# Patient Record
Sex: FEMALE | Race: BLACK OR AFRICAN AMERICAN | URBAN - METROPOLITAN AREA
[De-identification: names, ages, dates, MRNs, and addresses within clinical notes are randomized per-mention and may not be internally consistent; named-entity substitution may affect disease eponyms.]

---

## 2024-06-20 ENCOUNTER — HOME HEALTH ADMISSION (OUTPATIENT)
Age: 84
End: 2024-06-20
Payer: MEDICARE

## 2024-06-21 ENCOUNTER — HOME CARE VISIT (OUTPATIENT)
Age: 84
End: 2024-06-21

## 2024-06-22 ENCOUNTER — HOME CARE VISIT (OUTPATIENT)
Age: 84
End: 2024-06-22
Payer: MEDICARE

## 2024-06-22 PROCEDURE — G0299 HHS/HOSPICE OF RN EA 15 MIN: HCPCS

## 2024-06-24 ENCOUNTER — HOME CARE VISIT (OUTPATIENT)
Age: 84
End: 2024-06-24
Payer: MEDICARE

## 2024-06-24 VITALS
OXYGEN SATURATION: 94 % | DIASTOLIC BLOOD PRESSURE: 70 MMHG | TEMPERATURE: 98 F | SYSTOLIC BLOOD PRESSURE: 110 MMHG | RESPIRATION RATE: 18 BRPM | HEART RATE: 76 BPM

## 2024-06-24 VITALS
TEMPERATURE: 97.3 F | DIASTOLIC BLOOD PRESSURE: 78 MMHG | RESPIRATION RATE: 16 BRPM | SYSTOLIC BLOOD PRESSURE: 126 MMHG | OXYGEN SATURATION: 95 % | HEART RATE: 75 BPM

## 2024-06-24 PROCEDURE — G0299 HHS/HOSPICE OF RN EA 15 MIN: HCPCS

## 2024-06-24 ASSESSMENT — ENCOUNTER SYMPTOMS
TROUBLE SWALLOWING: 1
DYSPNEA ACTIVITY LEVEL: AT REST
PAIN LOCATION - PAIN QUALITY: ACHY
PAIN LOCATION - PAIN QUALITY: INTERMITTENT

## 2024-06-24 NOTE — HOME HEALTH
this time.      Physician notified of patient admission to home health and plan of care including anticipated frequency of 3w4, 2 prn and treatments/interventions/modalities of disease process education, medication management and protime checks to be faxed to dr hernandez m/w/f.    Discharge planning discussed with patient and caregiver.  Discharge planning as follows: when patient is independent with disease process and medication managment.  Pt/Caregiver did verbalize understanding of discharge planning.     Next MD appointment July 1 with Dr. Hernandez and July 25 with Carl (neurology) MD/NP/PA.  Patient/caregiver encouraged/instructed to keep appointment as lack of follow through with physician appointment could result in discontinuation of home care services for non-compliance.

## 2024-06-24 NOTE — HOME HEALTH
Skilled reason for visit: Patient was recently hospitalized for Dysphagia, requiring observation by a SN for s/s of decomposition or adverse effects resulting from newly prescribed medications.  Skilled observation needed to determine if new medication regimen prescribed requires modifications or other therapeutic interventions considered until pt's clinical condition or treatment has stabilized.  Caregiver involvement:  Patient's caregiver is is her son. Caregiver assists patient with bathing, dressing, walking, bathroom, meal prep and setup, medication management, grocery shopping, household chores, transportation to MD appointment and home exercise program.  Medications reconciled and all medications are available in the home this visit.  The following education was provided regarding medications, medication interactions, and look alike modifications.    warfarin (COUMADIN) 5 MG tablet       Contact Agency or MD with questions.  Medications are effective at this time.  Patient states understanding.    Patient education provided this visit:  .X.... Disease Management: PT/INR teaching, Dysphagia teaching, pain management.  See interventions for further teaching  Patient level of understanding of education provided: Pt verbalized understanding of all education and repeated back teaching   Skilled Care Performed this visit: Disease process teaching, medication teaching, physical assessment and monitoring  Patient response to procedure performed: NA  Sharps Education Provided: GARRY  Goals/teaching progressing. Patient's goal is to regain her strength. Progressing toward goals. Patient has remained free from falls, free from infection; no safety concerns at this time.  SN to complete education of patient and patient to follow up with any further questions or concerns with Dr Obrien  Home exercise program:  Fall risk prevention  Continued need for the following skills: Nursing, PT   Patient and/or caregiver notified and

## 2024-06-25 ENCOUNTER — HOME CARE VISIT (OUTPATIENT)
Age: 84
End: 2024-06-25
Payer: MEDICARE

## 2024-06-25 VITALS
SYSTOLIC BLOOD PRESSURE: 130 MMHG | RESPIRATION RATE: 16 BRPM | OXYGEN SATURATION: 95 % | TEMPERATURE: 97.3 F | HEART RATE: 72 BPM | DIASTOLIC BLOOD PRESSURE: 78 MMHG

## 2024-06-25 LAB
INTERNATIONAL NORMALIZATION RATIO, POC: 16.5 (ref 0.9–1.1)
PROTHROMBIN TIME, POC: 1.4 (ref 11.8–14.9)

## 2024-06-25 PROCEDURE — G0155 HHCP-SVS OF CSW,EA 15 MIN: HCPCS

## 2024-06-25 PROCEDURE — G0299 HHS/HOSPICE OF RN EA 15 MIN: HCPCS

## 2024-06-25 ASSESSMENT — ENCOUNTER SYMPTOMS: PAIN LOCATION - PAIN QUALITY: INTERMITTENT

## 2024-06-25 NOTE — HOME HEALTH
Skilled reason for visit: Patient was recently hospitalized for Dysphagia, requiring observation by a SN for s/s of decomposition or adverse effects resulting from newly prescribed medications.  Skilled observation needed to determine if new medication regimen prescribed requires modifications or other therapeutic interventions considered until pt's clinical condition or treatment has stabilized.  Caregiver involvement:  Patient's caregiver is her son. Caregiver assists patient with bathing, dressing, walking, bathroom, meal prep and setup, medication management, grocery shopping, household chores, transportation to MD appointment and home exercise program.  Medications reconciled and all medications are available in the home this visit.  The following education was provided regarding medications, medication interactions, and look alike modifications.  traMADol (ULTRAM) 50 MG tablet         Contact Agency or MD with questions.  Medications are effective at this time.  Patient states understanding.    Patient education provided this visit:  .X.... Disease Management: PT/INR, Dysphagia teaching, pain management.  See interventions for further teaching  Patient level of understanding of education provided: Pt verbalized understanding of all education and repeated back teaching   Skilled Care Performed this visit: Disease process teaching, medication teaching, physical assessment and monitoring  Patient response to procedure performed:  NA  Sharps Education Provided: GARRY  Goals/teaching progressing. Patient's goal is to regain strength. Progressing toward goals. Patient has remained free from falls, free from infection; no safety concerns at this time and is ambulating independently.  SN to complete education of patient and patient to follow up with any further questions or concerns with Dr Hernandez  Home exercise program:  Fall risk prevention  Continued need for the following skills: Nursing, PT   Patient and/or

## 2024-06-26 ENCOUNTER — HOME CARE VISIT (OUTPATIENT)
Age: 84
End: 2024-06-26
Payer: MEDICARE

## 2024-06-26 VITALS
TEMPERATURE: 98.3 F | HEART RATE: 87 BPM | SYSTOLIC BLOOD PRESSURE: 111 MMHG | DIASTOLIC BLOOD PRESSURE: 63 MMHG | RESPIRATION RATE: 18 BRPM | OXYGEN SATURATION: 94 %

## 2024-06-26 PROCEDURE — G0151 HHCP-SERV OF PT,EA 15 MIN: HCPCS

## 2024-06-26 NOTE — HOME HEALTH
MSW met with the pt and her son/caregiver Frank Jean Baptiste. Pt relocated to [place] from [place], and her son stated she had a stroke the day after moving. MSW assisted pt and son in contacting [facility] about transportation benefits; pt has emergency ambulance coverage services or authorization through an ambulance company. Pt has post-discharge services through her former employer of 6 hours of care assistance through [employer] (in-home non-medical assistance), frozen meals (authorization completed), Personal Emergency Response System (medical alert) devices, and [employer] transportation ([phone]). Wheelchair transport was scheduled for 7/1/24 with Dr. TIMBO Hernandez (Res #[id number]) and 7/25/24 with Dr. GABY Henry (Res # [id number]). MSW provided resource information and invited a return call if additional resource questions arise. MSW will follow-up with pt and son about additional transit options, Advanced Directives and resources to support access and understanding.

## 2024-06-26 NOTE — CASE COMMUNICATION
SN missed visit, as patient was seen yesterday for PT/INR testing and will be seen again on Friday. Ronaldo Hernandez II, DO notified.

## 2024-06-27 ENCOUNTER — HOME CARE VISIT (OUTPATIENT)
Age: 84
End: 2024-06-27
Payer: MEDICARE

## 2024-06-27 VITALS
RESPIRATION RATE: 17 BRPM | DIASTOLIC BLOOD PRESSURE: 72 MMHG | OXYGEN SATURATION: 94 % | SYSTOLIC BLOOD PRESSURE: 118 MMHG | HEART RATE: 73 BPM | TEMPERATURE: 97.1 F

## 2024-06-27 PROCEDURE — G0153 HHCP-SVS OF S/L PATH,EA 15MN: HCPCS

## 2024-06-27 ASSESSMENT — ENCOUNTER SYMPTOMS
PROTECTIVE COUGH: 1
PRODUCTIVE COUGH: 1
DYSPNEA WITH EXERTION: 1
PAIN LOCATION - PAIN QUALITY: ACHE

## 2024-06-27 NOTE — HOME HEALTH
S: Patient was a poor historian but her son was present at the bedside and was able to report this last stroke was 6/3/24 and she did go to rehab before coming home. He reports she has been bed bound for greater than a year after she fell and fractured her L hip.   O: PAIN: see pain tab   WOUND:no open wounds noted or reported.   ROM: knee and ankle contractures consistent with bed bound status. LLE hemiparesis from old stroke for which patient wore an AFO when she was ambulatory. BUE AROM WFL   STRENGTH: unable to assess due to bed bound status and contractures but patient does have full AROM of R hip abd/add and is able to perform a SLR and has active DF/PF within limited contracted range of right ankle. Trace movement of LLE   BED MOBILITY: Mod-Min A for rolling, Max A/dependent for scooting   EQUIPMENT: hospital bed, WC, slide board  TRANSFERS: patient declined sitting EOB this visit and had just takend milk of magnesia and had a BM during my visit. Her son provided total toileting care.  GAIT: n/a  STEPS: n/a, first floor appartment   BALANCE: unable to assess sitting balance this visit   A:ASSESSMENT AND PROGRESS TOWARD GOALS:  Patient demonstrated a positive result to therapy this date as evidenced by patient/caregiver expressing an understanding of the rehab plan due to therapy verbal and written instructions. Goals established for increased independence in the home, safe mobility in the home, improvement in strength and balance all designed to reduce fall risk and progress toward independence.  Patient will benefit from continued PT intervention to progress toward meeting all established goals     P: 1W1, 2W3      PMH/Summary of clinical condition: Ritika Clancy is an 83 y.o. female referred with a dx of dysphagia s/p CVA 6/3/24. PMH:breast cancer, aortic valve replacement on Coumadin, hypertension, COPD, presenting with acute on chronic left-sided weakness with notable M2 proximal occlusion. Active

## 2024-06-27 NOTE — CASE COMMUNICATION
Ritika Clancy is an 83 y.o. female referred with a dx of dysphagia s/p CVA 6/3/24. PMH:breast cancer, aortic valve replacement on Coumadin, hypertension, COPD, presenting with acute on chronic left-sided weakness with notable M2 proximal occlusion. Active Problems:  Chronic obstructive pulmonary disease  History of aortic valve replacement  History of malignant neoplasm of breast  Hypertensive disorder  Left hemiparesis  Weakness of  bilateral lower limb  Pseudobulbar affect  Mild Vascular Neurocognitive Disorder  Contracture of joint of multiple sites  Facial weakness following cerebral infarction  Impaired cognition  Pharyngeal dysphagia  Plan: 1W1, 2W3

## 2024-06-28 ENCOUNTER — HOME CARE VISIT (OUTPATIENT)
Age: 84
End: 2024-06-28
Payer: MEDICARE

## 2024-06-28 VITALS
OXYGEN SATURATION: 95 % | RESPIRATION RATE: 16 BRPM | TEMPERATURE: 97.9 F | DIASTOLIC BLOOD PRESSURE: 77 MMHG | SYSTOLIC BLOOD PRESSURE: 108 MMHG | HEART RATE: 79 BPM

## 2024-06-28 PROCEDURE — G0299 HHS/HOSPICE OF RN EA 15 MIN: HCPCS

## 2024-06-28 ASSESSMENT — ENCOUNTER SYMPTOMS: PAIN LOCATION - PAIN QUALITY: INTERMITTENT

## 2024-06-28 NOTE — CASE COMMUNICATION
Patient presents with moderate cognitive deficits evidence of SLUMS Assessment score of 14/30, which suggests dementia. Patient demonstrated increased difficulty for tasks requiring orientation awareness, thought organization, problem solving, and stm recall. Patient demonstrated great participation in evaluation, but had difficulty with maintaining attention for tasks and recall of biographical information without assistance. Educated  patient on daily cognitive stimulation tasks to which patient verbalized understanding. Patient seen with regular solids and thin liquids with no overt signs or symptoms of aspiration/penetration. Educated patient on swallowing strategies (small bites/sips, sitting upright, slow pace, daily oral care) to which patient verbalized understanding. Patient requires skilled speech therapy services for 1wk3 to target cognitive lingusitic skil ls by implementing memory strategies, graded exercises, and caregiver education to improve participation in cognitive communication tasks for increased safety and independence.

## 2024-06-28 NOTE — HOME HEALTH
Skilled reason for visit: Patient was recently hospitalized for Dysphagia , requiring observation by a SN for s/s of decomposition or adverse effects resulting from newly prescribed medications.  Skilled observation needed to determine if new medication regimen prescribed requires modifications or other therapeutic interventions considered until pt's clinical condition or treatment has stabilized.  Caregiver involvement:  Patient's caregiver is her son. Caregiver assists patient with bathing, dressing, walking, bathroom, meal prep and setup, medication management, grocery shopping, household chores, transportation to MD appointment and home exercise program.  Medications reconciled and all medications are available in the home this visit.  The following education was provided regarding medications, medication interactions, and look alike modifications. atorvastatin (LIPITOR) 40 MG tablet          Contact Agency or MD with questions.  Medications are effective at this time.  Patient states understanding.    Patient education provided this visit:  .X.... Disease Management: Memeory issues teaching, pain management.  See interventions for further teaching  Patient level of understanding of education provided: Pt verbalized understanding of all education and repeated back teaching   Skilled Care Performed this visit: Disease process teaching, medication teaching, physical assessment and monitoring  Patient response to procedure performed:  GARRY  Sharps Education Provided: NA  Goals/teaching progressing. Patient's goal is to regain her strength. Progressing toward goals. Patient has remained free from falls, free from infection; no safety concerns at this time..  SN to complete education of patient and patient to follow up with any further questions or concerns with Dr Hernandez  Home exercise program:  Fall risk prevention  Continued need for the following skills: Nursing, PT, ST  Patient and/or caregiver notified and agree

## 2024-07-02 ENCOUNTER — HOME CARE VISIT (OUTPATIENT)
Age: 84
End: 2024-07-02
Payer: MEDICARE

## 2024-07-02 VITALS
SYSTOLIC BLOOD PRESSURE: 120 MMHG | OXYGEN SATURATION: 94 % | TEMPERATURE: 98.2 F | HEART RATE: 61 BPM | DIASTOLIC BLOOD PRESSURE: 72 MMHG | RESPIRATION RATE: 17 BRPM

## 2024-07-02 PROCEDURE — G0153 HHCP-SVS OF S/L PATH,EA 15MN: HCPCS

## 2024-07-03 ENCOUNTER — HOME CARE VISIT (OUTPATIENT)
Age: 84
End: 2024-07-03
Payer: MEDICARE

## 2024-07-03 PROCEDURE — G0152 HHCP-SERV OF OT,EA 15 MIN: HCPCS

## 2024-07-03 PROCEDURE — G0151 HHCP-SERV OF PT,EA 15 MIN: HCPCS

## 2024-07-03 NOTE — HOME HEALTH
SUBJECTIVE: Patient reported no changes since last session.     CAREGIVER INVOLVEMENT / ASSISTANCE NEEDED FOR: transportation, medication management, assists with ADLs    OBJECTIVE / PATIENT RESPONSE TO TREATMENT / PATIENT LEVEL OF UNDERSTANDING OF EDUCATION PROVIDED: Educated patient on compensatory memory strategies and practical applications, patient required mod-max cues in order to increase accuracy with stm recall. With implementation of graded exercises, patient was able to recall functional information with accuracy following delay. Patient's son had obtained visual calendar to assist patient with orientation awareness and recall of upcoming appointments. Patient continues to progress towards  speech therapy goals.    ASSESSMENT OF PROGRESS TOWARD GOALS: Patient demonstrated good response to recall of compensatory memory strategies.    CONTINUED NEED FOR THE FOLLOWING SKILLS: Patient continues to require skilled speech therapy services in order to improve cognitive lingusitic deficits for increased safety awareness and participation in daily cognitive communication tasks.    PLAN FOR NEXT VISIT : Plans to continue implementing compensatory memory strategies/graded exercises.    HOME EXERCISE PROGRAM: implement compensatory memory strategies.    THE FOLLOWING DISCHARGE PLANNING WAS DISCUSSED WITH THE PATIENT/CAREGIVER: ST to d/c in 2 more visits/wks or when goals met/max potential achieved, Pt/caregiver verbalized understanding.

## 2024-07-04 ENCOUNTER — HOME CARE VISIT (OUTPATIENT)
Age: 84
End: 2024-07-04
Payer: MEDICARE

## 2024-07-04 VITALS
DIASTOLIC BLOOD PRESSURE: 78 MMHG | OXYGEN SATURATION: 93 % | RESPIRATION RATE: 18 BRPM | SYSTOLIC BLOOD PRESSURE: 136 MMHG | HEART RATE: 76 BPM | TEMPERATURE: 98 F

## 2024-07-04 NOTE — HOME HEALTH
SUBJECTIVE: Patient stated she was doing well and her son stated he was able to get her to a doctor appointment on Monday using the slide board to get her into the WC.  CAREGIVER INVOLVEMENT/ASSISTANCE NEEDED FOR: son is very attentive and assiting with all care.  OBJECTIVE:  See interventions.  PATIENT EDUCATION PROVIDED THIS VISIT: patient/cg instructed in progression of a daily HEP and given written updated copy  PATIENT RESPONSE TO EDUCATION PROVIDED: patient expressed understanding with returned demonstration.   PATIENT RESPONSE TO TREATMENT: patient reported low back tightness and had some fear with sitting EOB this visit.  ASSESSMENT OF PROGRESS TOWARD GOALS: patient is making good progress towards goals as noted with her eagerness to participate in therapy session and she has great support from her son to assist in all areas of need.  PLAN FOR NEXT VISIT: observe transfers of CG with use of slide board to  or recliner  THE FOLLOWING DISCHARGE PLANNING WAS DISCUSSED WITH THE PATIENT/CAREGIVER: 1W1, 2W3

## 2024-07-05 ENCOUNTER — HOME CARE VISIT (OUTPATIENT)
Age: 84
End: 2024-07-05
Payer: MEDICARE

## 2024-07-05 VITALS
OXYGEN SATURATION: 93 % | TEMPERATURE: 97.9 F | DIASTOLIC BLOOD PRESSURE: 80 MMHG | RESPIRATION RATE: 16 BRPM | SYSTOLIC BLOOD PRESSURE: 140 MMHG | HEART RATE: 70 BPM

## 2024-07-05 PROCEDURE — G0299 HHS/HOSPICE OF RN EA 15 MIN: HCPCS

## 2024-07-05 NOTE — HOME HEALTH
dynamic sitting balance/tolerance. Pt is bed bound. Therapist unable to assess static and dynamic standing balance/tolerance.    AMBULATION: Pt unable to ambulate. Pt is bedbound.  BED MOBILITY: Pt MODERATE ASSISTANCE with bed mobility  SIT TO STAND: Pt unable to perform sit<->stand. Pt is bedbound.  TOILET: N/A  SHOWER: N/A    Barthel Index: 20  IADLs: Son performs all IADLs   DME ORDERED/RECOMMENDED: Leg     PATIENT RESPONSE TO TREATMENT: Pt tolerated treatment well with no reports of increased pain/discomfort throughout the session.     PATIENT EDUCATION PROVIDED THIS VISIT: Pt and/or caregiver trained/educated in the role of occupational therapy; areas of occupation; occupation-based activity demands; performance patterns and skills; context and environmental factors; safety; balance; fall prevention; activity modifications; compensatory strategies; use of adaptive device(s) and/or DME; proper posture and body mechanics; progression of treatment plan; visit frequency and duration; discharge planning; and contact procedures for routine questions and emergency situations. This therapist reviewed the patient's needs to ensure no additional resources are needed at this time and confirm the current resources are sufficient and appropriate. All training and education is for the purpose of increasing safety and awareness of function in order to increase the patient's ability to perform ADLs and access the community with a decreasing degree of difficulty. This will improve the patient's quality of life by increasing his/her physical, psychological and social functioning while minimizing the necessity of future skilled medical services.     PATIENT LEVEL OF UNDERSTANDING OF EDUCATION PROVIDED: Pt and son demonstrated good understanding of role of OT, plan of care, visit frequency and discharge planning. Both will require reinforcement of education for carryover and adherence.     REHAB POTENTIAL: Ms. Clancy

## 2024-07-05 NOTE — HOME HEALTH
Skilled reason for visit: Patient was recently hospitalized for Dysphagia, requiring observation by a SN for s/s of decomposition or adverse effects resulting from newly prescribed medications.  Skilled observation needed to determine if new medication regimen prescribed requires modifications or other therapeutic interventions considered until pt's clinical condition or treatment has stabilized.  Caregiver involvement:  Patient's caregiver is her son. Caregiver assists patient with bathing, dressing, walking, bathroom, meal prep and setup, medication management, grocery shopping, household chores, transportation to MD appointment and home exercise program.  Medications reconciled and all medications are available in the home this visit.  The following education was provided regarding medications, medication interactions, and look alike modifications.  atorvastatin (LIPITOR) 40 MG tablet         Contact Agency or MD with questions.  Medications are effective at this time.  Patient states understanding.    Patient education provided this visit:  .X.... Disease Management: PT/INR teaching, HTN teaching.  See interventions for further teaching  Skilled Care Performed this visit: Disease process teaching, medication teaching, physical assessment and monitoring  Patient response to procedure performed: NA  Sharps Education Provided: NA  Goals/teaching progressing. Patient's goal is to regain strength. Progressing toward goals. Patient has remained free from falls, free from infection; no safety concerns at this time.  SN to complete education of patient and patient to follow up with any further questions or concerns with NP Emmanuel  Home exercise program:  Fall risk prevention  Continued need for the following skills: Nursing, PT   Patient and/or caregiver notified and agree to changes in the Plan of Care: No changes  The following discharge planning was discussed with the pt/caregiver:  SN to continue education of

## 2024-07-06 ENCOUNTER — HOME CARE VISIT (OUTPATIENT)
Age: 84
End: 2024-07-06
Payer: MEDICARE

## 2024-07-06 VITALS
OXYGEN SATURATION: 97 % | DIASTOLIC BLOOD PRESSURE: 74 MMHG | HEART RATE: 70 BPM | RESPIRATION RATE: 16 BRPM | TEMPERATURE: 97.3 F | SYSTOLIC BLOOD PRESSURE: 132 MMHG

## 2024-07-06 PROCEDURE — G0157 HHC PT ASSISTANT EA 15: HCPCS

## 2024-07-08 ENCOUNTER — HOME CARE VISIT (OUTPATIENT)
Age: 84
End: 2024-07-08
Payer: MEDICARE

## 2024-07-08 VITALS
OXYGEN SATURATION: 98 % | SYSTOLIC BLOOD PRESSURE: 123 MMHG | HEART RATE: 68 BPM | RESPIRATION RATE: 14 BRPM | TEMPERATURE: 98.3 F | DIASTOLIC BLOOD PRESSURE: 76 MMHG

## 2024-07-08 VITALS
SYSTOLIC BLOOD PRESSURE: 138 MMHG | DIASTOLIC BLOOD PRESSURE: 80 MMHG | RESPIRATION RATE: 18 BRPM | HEART RATE: 70 BPM | TEMPERATURE: 97.9 F | OXYGEN SATURATION: 95 %

## 2024-07-08 PROCEDURE — G0152 HHCP-SERV OF OT,EA 15 MIN: HCPCS

## 2024-07-08 NOTE — HOME HEALTH
Subjective: Patient reports that she has not fallen since last visit and is changing position in bed occasionally, but no regularly. Patient found long sitting in bed with heels floating via pillows under calves bilaterally.     Objective: Skilled home health physical therapy interventions completed today listed in care plan section.  Interventions performed today to assist in return to prior level of function, address deficits as apparent upon time of initial evaluation, return to community and personal activities, and progress further towards goals as previously established in plan of care. There are not any changes to medications upon timing of this visit. Home health supplies were not ordered/delivered today. Trained in need for more regular repositioning in bed to reduce pressure injury risk with both patient and caregiver verbalizing understanding. Patient able to roll to side modified independent today using bed rail but needed additional assistance for scooting repositioning including reminders for hospital bed positioning. Noted triceps weakness and poor trunk control with sliding board transfer today.     Assessment:  Patient is slowly progressing towards goals as previously established in POC with skilled home health physical therapy services at this time as made apparent by improving understanding for regular HEP completion and repositioning so that she can make additional progress along with her verbal agreement to trying to do so. Would benefit from working at sitting at edge of bed to improve trunk control. Family/caregiver son involvement is present/set-up at this point in time and assists with all aspects of patient's care and needs. Trained patient caregiver son in safety techniques for positioning of AD w/c and sliding board to improve transfer ability, but will need review.     Plan:  Discharge planning discussed at this visit regarding eventual discharge once patient has met goals and/or met

## 2024-07-09 ENCOUNTER — HOME CARE VISIT (OUTPATIENT)
Age: 84
End: 2024-07-09
Payer: MEDICARE

## 2024-07-09 VITALS
OXYGEN SATURATION: 92 % | HEART RATE: 78 BPM | RESPIRATION RATE: 18 BRPM | TEMPERATURE: 97.9 F | DIASTOLIC BLOOD PRESSURE: 78 MMHG | SYSTOLIC BLOOD PRESSURE: 122 MMHG

## 2024-07-09 VITALS
OXYGEN SATURATION: 93 % | TEMPERATURE: 98.2 F | RESPIRATION RATE: 17 BRPM | SYSTOLIC BLOOD PRESSURE: 100 MMHG | HEART RATE: 80 BPM | DIASTOLIC BLOOD PRESSURE: 65 MMHG

## 2024-07-09 VITALS
TEMPERATURE: 97.3 F | RESPIRATION RATE: 16 BRPM | HEART RATE: 79 BPM | SYSTOLIC BLOOD PRESSURE: 107 MMHG | OXYGEN SATURATION: 95 % | DIASTOLIC BLOOD PRESSURE: 73 MMHG

## 2024-07-09 LAB
INTERNATIONAL NORMALIZATION RATIO, POC: 14.8 (ref 0.9–1.1)
PROTHROMBIN TIME, POC: 1.2 (ref 11.8–14.9)

## 2024-07-09 PROCEDURE — G0299 HHS/HOSPICE OF RN EA 15 MIN: HCPCS

## 2024-07-09 PROCEDURE — G0153 HHCP-SVS OF S/L PATH,EA 15MN: HCPCS

## 2024-07-09 PROCEDURE — G0152 HHCP-SERV OF OT,EA 15 MIN: HCPCS

## 2024-07-09 NOTE — HOME HEALTH
SUBJECTIVE: Pt and son unprepared for therapist but willing to participate in OT session. Son is present to observe and learn.    CAREGIVER INVOLVEMENT/ASSISTANCE NEEDED FOR: Son assists with all I/ADLS     HOME HEALTH SUPPLIES BY TYPE AND QUANTITY ORDERED/DELIVERED THIS VISIT INCLUDE: NONE      OBJECTIVE: See interventions.      PATIENT EDUCATION PROVIDED THIS VISIT: Pt trained/educated in occupation-based activity demands; performance patterns and skills; context and environmental factors; safety; balance; fall prevention; activity modifications; compensatory strategies; use of adaptive device(s) and/or DME; proper posture and body mechanics; progression of treatment plan; visit frequency and duration; discharge planning; and contact procedures for routine questions and emergency situations. This therapist reviewed the patient's needs to ensure no additional resources are needed at this time and confirm the current resources are sufficient and appropriate. All training and education are for the purpose of increasing safety and awareness of function in order to increase the patient's ability to perform ADLs, IADLs and access the community with a decreasing degree of difficulty. This will improve the patient's quality of life by increasing his/her physical, psychological and social functioning while minimizing the necessity of future skilled medical services.    PATIENT RESPONSE TO EDUCATION: Patient and son able to teach-back education with min verbal cues. Handouts provided for reinforcement.       RESPONSE TO TREATMENT: Pt tolerated treatment well as evidenced by vitals remained stable throughout session. No reports of increased pain/discomfort throughout session. No reports of dizziness, lightheadedness, weakness, etc. Pt left resting comfortably in bed with pressure areas offloaded, son in attendance, and with all needs met.       ASSESSMENT OF PROGRESS TOWARD GOALS: Pt is progressing well towards OT goals and

## 2024-07-09 NOTE — HOME HEALTH
Skilled reason for visit: Patient was recently hospitalized for Dysphagia , requiring observation by a SN for s/s of decomposition or adverse effects resulting from newly prescribed medications.  Skilled observation needed to determine if new medication regimen prescribed requires modifications or other therapeutic interventions considered until pt's clinical condition or treatment has stabilized.  Caregiver involvement:  Patient's caregiver is her son. Caregiver assists patient with bathing, dressing, walking, bathroom, meal prep and setup, medication management, grocery shopping, household chores, transportation to MD appointment and home exercise program.  Medications reconciled and all medications are available in the home this visit.  The following education was provided regarding medications, medication interactions, and look alike modifications.  carvedilol (COREG) 3.125 MG tablet         Contact Agency or MD with questions.  Medications are effective at this time.  Patient states understanding.    Patient education provided this visit:  .X.... Disease Management: PT/INR teaching.  See interventions for further teaching  Patient level of understanding of education provided: Pt verbalized understanding of all education and repeated back teaching   Skilled Care Performed this visit: Disease process teaching, medication teaching, physical assessment and monitoring  Patient response to procedure performed:  Pt verbalized satisfaction with PT/INR testing  Sharps Education Provided: NA  Goals/teaching progressing. Patient's goal is to regain her strength. Progressing toward goals. Patient has remained free from falls, free from infection; no safety concerns at this time.  SN to complete education of patient and patient to follow up with any further questions or concerns with Dr Hernandez  Home exercise program:  Fall risk prevention  Continued need for the following skills: Nursing, PT, OT, ST  Patient and/or

## 2024-07-10 ENCOUNTER — HOME CARE VISIT (OUTPATIENT)
Age: 84
End: 2024-07-10
Payer: MEDICARE

## 2024-07-10 VITALS
OXYGEN SATURATION: 94 % | HEART RATE: 85 BPM | DIASTOLIC BLOOD PRESSURE: 84 MMHG | RESPIRATION RATE: 16 BRPM | TEMPERATURE: 98.4 F | SYSTOLIC BLOOD PRESSURE: 136 MMHG

## 2024-07-10 PROCEDURE — G0151 HHCP-SERV OF PT,EA 15 MIN: HCPCS

## 2024-07-10 NOTE — HOME HEALTH
SUBJECTIVE: Patient reported no changes since last session.     CAREGIVER INVOLVEMENT / ASSISTANCE NEEDED FOR: transportation, medication management, assists with ADLs     OBJECTIVE / PATIENT RESPONSE TO TREATMENT / PATIENT LEVEL OF UNDERSTANDING OF EDUCATION PROVIDED: Patient demonstrated great participation in stm recall of address and memory strategies by implementing spaced retrieval training trials. With implementation of visual aids, patient was able to improve orientation awareness. With graded exercises, patient was able to complete mental manipulation tasks with increased accuracy. Patient continues to progress towards  speech therapy goals.     ASSESSMENT OF PROGRESS TOWARD GOALS: Patient demonstrated good improvement in stm recall with spaced retrieval training.    CONTINUED NEED FOR THE FOLLOWING SKILLS: Patient continues to require skilled speech therapy services in order to improve cognitive lingusitic deficits for increased safety awareness and participation in daily cognitive communication tasks.     PLAN FOR NEXT VISIT : Plans to discharge.    HOME EXERCISE PROGRAM: implement compensatory memory strategies.     THE FOLLOWING DISCHARGE PLANNING WAS DISCUSSED WITH THE PATIENT/CAREGIVER: ST to d/c in 1 more visits/wks or when goals met/max potential achieved, Pt/caregiver verbalized understanding.

## 2024-07-11 ENCOUNTER — HOME CARE VISIT (OUTPATIENT)
Age: 84
End: 2024-07-11
Payer: MEDICARE

## 2024-07-11 PROCEDURE — G0155 HHCP-SVS OF CSW,EA 15 MIN: HCPCS

## 2024-07-11 NOTE — HOME HEALTH
SUBJECTIVE: Patient stated she was tired today and just felt very uncomfortable because she had been struggling to have a BM all day. She was successful but feeling a little down today.  CAREGIVER INVOLVEMENT/ASSISTANCE NEEDED FOR: son attentive and assisting with all ADL's.  OBJECTIVE:  See interventions.  PATIENT EDUCATION PROVIDED THIS VISIT: patient instructed to continue with daily HEP and to try lowering head of bed more for supine to sit pull ups as well as doing them in a more upright position.  PATIENT RESPONSE TO EDUCATION PROVIDED: patient expressed understanding with returned demonstration  PATIENT RESPONSE TO TREATMENT: patient a little down and cried during visit, needing encouragement that it is okay to have bad days. She did not want to work on slide board transfers today due to not feeling well.  ASSESSMENT OF PROGRESS TOWARD GOALS: patient did not show any improvements this visit with functional mobility but she was fatigued from having a BM earlier and had a busy day yesterday with several appointments.   PLAN FOR NEXT VISIT: progress transfer training  THE FOLLOWING DISCHARGE PLANNING WAS DISCUSSED WITH THE PATIENT/CAREGIVER: 1W1, 2W1

## 2024-07-11 NOTE — HOME HEALTH
MSW met with the pt and her son, and discussed pt's current functioning and communication styles. Pt received meals through her insurance. Pt voiced completing her exercises and desires to be more independent. All parties discussed mental health, self-care, and strategies to support wellbeing. MSW discussed accessible transportation, POA and financial management. Pt has a neurology appointment 7/25/24. Pt has a visiting appointment 7/29/24 (provider/agency unclear). MSW will apply for [employer] for the pt to support accessible transportation.

## 2024-07-12 ENCOUNTER — HOME CARE VISIT (OUTPATIENT)
Age: 84
End: 2024-07-12
Payer: MEDICARE

## 2024-07-12 VITALS
DIASTOLIC BLOOD PRESSURE: 79 MMHG | RESPIRATION RATE: 16 BRPM | SYSTOLIC BLOOD PRESSURE: 129 MMHG | OXYGEN SATURATION: 95 % | TEMPERATURE: 98 F | HEART RATE: 79 BPM

## 2024-07-12 LAB
INTERNATIONAL NORMALIZATION RATIO, POC: 25.4 (ref 0.9–1.1)
PROTHROMBIN TIME, POC: 2.1 (ref 11.8–14.9)

## 2024-07-12 PROCEDURE — G0299 HHS/HOSPICE OF RN EA 15 MIN: HCPCS

## 2024-07-12 NOTE — HOME HEALTH
Skilled reason for visit: Patient was recently hospitalized for Dysphagia, requiring observation by a SN for s/s of decomposition or adverse effects resulting from newly prescribed medications.  Skilled observation needed to determine if new medication regimen prescribed requires modifications or other therapeutic interventions considered until pt's clinical condition or treatment has stabilized.  Caregiver involvement:  Patient's caregiver is her son. Caregiver assists patient with bathing, dressing, walking, bathroom, meal prep and setup, medication management, grocery shopping, household chores, transportation to MD appointment and home exercise program.  Medications reconciled and all medications are available in the home this visit.  The following education was provided regarding medications, medication interactions, and look alike modifications.   traMADol (ULTRAM) 50 MG tablet        Contact Agency or MD with questions.  Medications are effective at this time.  Patient states understanding.    Patient education provided this visit:  .X.... Disease Management: PT/INR teaching.  See interventions for further teaching  Patient level of understanding of education provided: Pt verbalized understanding of all education and repeated back teaching   Skilled Care Performed this visit: Disease process teaching, medication teaching, physical assessment and monitoring  Patient response to procedure performed:  Pt verbalized satisfaction with PT/INR testing  Sharps Education Provided: NA  Goals/teaching progressing. Patient's goal is to regain her strength. Progressing toward goals. Patient has remained free from falls, free from infection; no safety concerns at this time.  SN to complete education of patient and patient to follow up with any further questions or concerns with Dr Hernandez  Home exercise program:  Fall risk prevention  Continued need for the following skills: Nursing, PT   Patient and/or caregiver notified

## 2024-07-13 ENCOUNTER — HOME CARE VISIT (OUTPATIENT)
Age: 84
End: 2024-07-13
Payer: MEDICARE

## 2024-07-16 ENCOUNTER — HOME CARE VISIT (OUTPATIENT)
Age: 84
End: 2024-07-16
Payer: MEDICARE

## 2024-07-16 VITALS
DIASTOLIC BLOOD PRESSURE: 72 MMHG | OXYGEN SATURATION: 95 % | RESPIRATION RATE: 17 BRPM | HEART RATE: 81 BPM | TEMPERATURE: 98.3 F | SYSTOLIC BLOOD PRESSURE: 100 MMHG

## 2024-07-16 PROCEDURE — G0153 HHCP-SVS OF S/L PATH,EA 15MN: HCPCS

## 2024-07-16 NOTE — HOME HEALTH
SUBJECTIVE: Patient reported no changes since last session.     CAREGIVER INVOLVEMENT / ASSISTANCE NEEDED FOR: transportation, medication management, assists with ADLs     OBJECTIVE / PATIENT RESPONSE TO TREATMENT / PATIENT LEVEL OF UNDERSTANDING OF EDUCATION PROVIDED/ASSESSMENT OF PROGRESS TOWARD GOALS: Patient seen for speech therapy discipline discharge today. Patient completed SLUMS Assessment with score of 21/30, which is improvement from initial evaluation score of 14/30. With implementation of graded exercises, spaced retrieval training, and visual aids, patient was able to demonstrate improvement in orientation awareness, thought organization, and stm recall. Caregiver also demonstrated great carryover of recommendations/communication strategies in order to support cognitive lingusitic skills. Patient was able to teach-back strategies and home maintenance program. Patient continued to deny dysphagia symptoms. Patient met all  speech therapy goals and is appropriate for discharge.

## 2024-07-16 NOTE — CASE COMMUNICATION
Patient seen for speech therapy discipline discharge today. Patient completed SLUMS Assessment with score of 21/30, which is improvement from initial evaluation score of 14/30. With implementation of graded exercises, spaced retrieval training, and visual aids, patient was able to demonstrate improvement in orientation awareness, thought organization, and stm recall. Caregiver also demonstrated great carryover of recommendations/communi cation strategies in order to support cognitive lingusitic skills. Patient was able to teach-back strategies and home maintenance program. Patient continued to deny dysphagia symptoms. Patient met all  speech therapy goals and is appropriate for discharge.

## 2024-07-17 ENCOUNTER — HOME CARE VISIT (OUTPATIENT)
Age: 84
End: 2024-07-17
Payer: MEDICARE

## 2024-07-18 ENCOUNTER — HOME CARE VISIT (OUTPATIENT)
Age: 84
End: 2024-07-18
Payer: MEDICARE

## 2024-07-18 VITALS
SYSTOLIC BLOOD PRESSURE: 106 MMHG | HEART RATE: 87 BPM | TEMPERATURE: 98.5 F | DIASTOLIC BLOOD PRESSURE: 70 MMHG | RESPIRATION RATE: 18 BRPM | OXYGEN SATURATION: 95 %

## 2024-07-18 PROCEDURE — G0151 HHCP-SERV OF PT,EA 15 MIN: HCPCS

## 2024-07-19 ENCOUNTER — HOME CARE VISIT (OUTPATIENT)
Age: 84
End: 2024-07-19
Payer: MEDICARE

## 2024-07-19 VITALS
DIASTOLIC BLOOD PRESSURE: 80 MMHG | HEART RATE: 66 BPM | RESPIRATION RATE: 16 BRPM | TEMPERATURE: 98.1 F | SYSTOLIC BLOOD PRESSURE: 120 MMHG | OXYGEN SATURATION: 94 %

## 2024-07-19 PROCEDURE — G0152 HHCP-SERV OF OT,EA 15 MIN: HCPCS

## 2024-07-19 PROCEDURE — G0299 HHS/HOSPICE OF RN EA 15 MIN: HCPCS

## 2024-07-19 NOTE — HOME HEALTH
SUBJECTIVE: Patient stated she feels tired since she was in the ER, it was a long taxing effort to get there an back for no reason. Her son states her PTINR was in a normal range and he doesn't really know why or what happenend that they had to go to the ER.  CAREGIVER INVOLVEMENT/ASSISTANCE NEEDED FOR: son assisting with all ADL's.  OBJECTIVE:  See interventions.  PATIENT EDUCATION PROVIDED THIS VISIT: patient instructed in an UE HEP program as her son had her sitting up and did leg exercises already this morning and she was very tired.   PATIENT RESPONSE TO EDUCATION PROVIDED: patient expressed understanding of exercises with returned demonstration.  PATIENT RESPONSE TO TREATMENT: patient reporting fatigue this visit and needing rest breaks between each exercise with VC's for pursed lipped breathing.  ASSESSMENT OF PROGRESS TOWARD GOALS: patient is making good progress towards goals with ability to sit up in her wheelchair this morning for >30 minutes.  PLAN FOR NEXT VISIT: observe patient's CG performing slideboard transfers  THE FOLLOWING DISCHARGE PLANNING WAS DISCUSSED WITH THE PATIENT/CAREGIVER: pt/cg informed of DC next visit and in agreement.

## 2024-07-20 ENCOUNTER — HOME CARE VISIT (OUTPATIENT)
Age: 84
End: 2024-07-20
Payer: MEDICARE

## 2024-07-20 VITALS
TEMPERATURE: 98.2 F | DIASTOLIC BLOOD PRESSURE: 70 MMHG | SYSTOLIC BLOOD PRESSURE: 110 MMHG | RESPIRATION RATE: 20 BRPM | HEART RATE: 88 BPM | OXYGEN SATURATION: 94 %

## 2024-07-22 ENCOUNTER — HOME CARE VISIT (OUTPATIENT)
Age: 84
End: 2024-07-22
Payer: MEDICARE

## 2024-07-22 LAB
INTERNATIONAL NORMALIZATION RATIO, POC: 1.3 (ref 0.9–1.1)
PROTHROMBIN TIME, POC: 15.1 (ref 11.8–14.9)

## 2024-07-22 PROCEDURE — G0299 HHS/HOSPICE OF RN EA 15 MIN: HCPCS

## 2024-07-22 ASSESSMENT — ENCOUNTER SYMPTOMS: STOOL DESCRIPTION: FORMED

## 2024-07-22 NOTE — CASE COMMUNICATION
Ritika Clancy received skilled PT, OT, ST and RN s/p CVA with dysphagia. This patient has currently met maximum potential with in home PT  and has been discharged to a HEP at this time. Discharge visit was attempted Saturday but the patient's son had an emergency and was not available. She had made good progress towards goals and her son is very involved in her care and assisting her with her daily HEP as needed. He reports that he  is able to transfer her into the [facility] and into the car with the assistance of one other. I was unable to observe the transfers due to patient declining my last 2 visits to demonstrate. The have been instructed in a daily HEP and both verbalized understanding of all discharge last visit.

## 2024-07-22 NOTE — HOME HEALTH
Skilled reason for visit: obtain PT/INR, patient coumading has been held due to INR being high. Today INR was 1.3. Results were called into Dr. Hernandez office.     Caregiver involvement: Son available for ADL care.    Medications reviewed and all medications are available in the home this visit.    The following education was provided regarding medications:  take all medications as prescribed.    MD notified of any discrepancies/look a-like medications/medication interactions: n/a  Medications are effective at this time.      Home health supplies by type and quantity ordered/delivered this visit include: n/a    Patient education provided this visit: continue to monitor for any s/s of bleeding, bruising, and/or clotting    Sharps education provided: n/a    Patient level of understanding of education provided: verbalized understanding    Patient response to procedure performed:  no complaints     Agency Progress toward goals: progressing - see intervention tab    Patient's Progress towards personal goals: progressing    Home exercise program: deep breathing exercises    Continued need for the following skills: Nursing.    Plan for next visit: PT/INR check    Patient and/or caregiver notified and agrees to changes in the Plan of Care: N/A.     The following discharge planning was discussed with the pt/caregiver: when patient is independent with disease process and medication managment

## 2024-07-22 NOTE — HOME HEALTH
Ritika Clancy received skilled PT, OT, ST and RN s/p CVA with dysphagia. This patient has currently met maximum potential with in home PT  and has been discharged to a HEP at this time. Discharge visit was attempted Saturday but the patient's son had an emergency and was not available. She had made good progress towards goals and her son is very involved in her care and assisting her with her daily HEP as needed. He reports that he is able to transfer her into the [facility] and into the car with the assistance of one other. I was unable to observe the transfers due to patient declining my last 2 visits to demonstrate. The have been instructed in a daily HEP and both verbalized understanding of all discharge last visit.

## 2024-07-22 NOTE — HOME HEALTH
Skilled reason for visit: Assessment/PT INR   PT INR done via fingerstick, 57.5/4.8  Both patient and son state patient has been taking 7.5mg as ordered nightly, and no new medications.  NO ss bleeding.   Reproted to Dr. Hernandez, orders received to hold coumadin x3 days, through sunday 7/21 and SN to repeat PT INR on 7/22/24, son notified by telephone and also spoke wiht patient re new orders, both were able to verbalize instructions back to nurse correctly.          Caregiver involvement: Son who lives with patient is primary caregiver.      Medications reviewed and all medications are available in the home this visit.    The following education was provided regarding medications:  medication regimen, doses, action, frequencies, potential side effects. .    MD notified of any discrepancies/look a-like medications/medication interactions: na  Medications are effective at this time.      Home health supplies by type and quantity ordered/delivered this visit include: na    Patient education provided this visit: Instructed patient/caregiver to notify SN/PT of signs and symptoms of anticoagulant adverse effects including bleeding gums, blood in urine or stool, easily bruising.  Instructed on importance of fall prevention due to risk for bleeding.        Sharps education provided: na    Patient level of understanding of education provided: Needs reinforcment, partial teachback     Patient response to procedure performed:  na    Agency Progress toward goals: INR fluctuating, mobility impaired post CVA    Patient's Progress towards personal goals: Id like to be able to get out of the bed .    Home exercise program: Change position frequently, turn frequently     Continued need for the following skills: Nursing and Physical Therapy, Occupational Therapy     Plan for next visit: assessment/PTINR    Patient and/or caregiver notified and agrees to changes in the Plan of Care: N/A.     The following discharge planning was

## 2024-07-24 ENCOUNTER — HOME CARE VISIT (OUTPATIENT)
Age: 84
End: 2024-07-24
Payer: MEDICARE

## 2024-07-25 NOTE — HOME HEALTH
Ms. Clancy met maximum potential with skilled OT services and has been discharged at this time. Multiple visits were attempted but her son was not availble for sessions including discharge. Pt made good progress towards goals and her son is very involved in her care. He performs all IADLs and assists with ADLs as needed. Pt and son instructed in OT HEP, energy conservation, proper body mechanics for safety during tasks. During previous sessions, both demonstrated understanding of all OT education and in agreement of discharge this week.

## 2024-07-26 ENCOUNTER — HOME CARE VISIT (OUTPATIENT)
Age: 84
End: 2024-07-26
Payer: MEDICARE

## 2024-07-26 PROCEDURE — G0299 HHS/HOSPICE OF RN EA 15 MIN: HCPCS

## 2024-07-28 VITALS
SYSTOLIC BLOOD PRESSURE: 112 MMHG | TEMPERATURE: 97.6 F | OXYGEN SATURATION: 93 % | DIASTOLIC BLOOD PRESSURE: 60 MMHG | RESPIRATION RATE: 18 BRPM | HEART RATE: 86 BPM

## 2024-07-29 ENCOUNTER — HOME CARE VISIT (OUTPATIENT)
Age: 84
End: 2024-07-29
Payer: MEDICARE

## 2024-07-29 LAB
INTERNATIONAL NORMALIZATION RATIO, POC: 2.4 (ref 0.9–1.1)
PROTHROMBIN TIME, POC: 28.4 (ref 11.8–14.9)

## 2024-07-29 PROCEDURE — G0299 HHS/HOSPICE OF RN EA 15 MIN: HCPCS

## 2024-07-29 NOTE — HOME HEALTH
Skilled reason for visit: PT/INR obtained 28.4/2.4    Caregiver involvement: son available for adl care and errands.    Medications reviewed and all medications are available in the home this visit.    The following education was provided regarding medications:  take all medications as prescribed.    MD notified of any discrepancies/look a-like medications/medication interactions: n/a  Medications are effective at this time.      Home health supplies by type and quantity ordered/delivered this visit include: n/a    Patient education provided this visit: repositioning as tolerated and assess for s/s of bleeding    Sharps education provided: n/a    Patient level of understanding of education provided: verbalized understanding     Patient response to procedure performed:  no complaints     Agency Progress toward goals: progressing - see intervention tab    Patient's Progress towards personal goals: progressing    Home exercise program: deep breathing exercises     Continued need for the following skills: Nursing.    Plan for next visit: PT/INR    Patient and/or caregiver notified and agrees to changes in the Plan of Care: N/A.     The following discharge planning was discussed with the pt/caregiver: when patient is independent with disease process and medication managment

## 2024-08-01 ENCOUNTER — HOME CARE VISIT (OUTPATIENT)
Age: 84
End: 2024-08-01
Payer: MEDICARE

## 2024-08-01 PROCEDURE — G0299 HHS/HOSPICE OF RN EA 15 MIN: HCPCS

## 2024-08-03 VITALS
TEMPERATURE: 98.1 F | RESPIRATION RATE: 20 BRPM | DIASTOLIC BLOOD PRESSURE: 64 MMHG | HEART RATE: 78 BPM | SYSTOLIC BLOOD PRESSURE: 110 MMHG | OXYGEN SATURATION: 93 %

## 2024-08-03 ASSESSMENT — ENCOUNTER SYMPTOMS
PAIN LOCATION - PAIN QUALITY: ACHE
BOWEL INCONTINENCE: 1
STOOL DESCRIPTION: SOFT

## 2024-08-03 NOTE — HOME HEALTH
skills: nursing     Plan for next visit: Assessment, INR     Patient and/or caregiver notified and agrees to changes in the Plan of Care  Yes,  SN 1wk2     The following discharge planning was discussed with the pt/caregiver: Discharge in 2 weeks , patient and cg notified and in agreement

## 2024-08-05 ENCOUNTER — HOME CARE VISIT (OUTPATIENT)
Age: 84
End: 2024-08-05
Payer: MEDICARE

## 2024-08-05 LAB
INTERNATIONAL NORMALIZATION RATIO, POC: 2.9 (ref 0.9–1.1)
PROTHROMBIN TIME, POC: 34.3 (ref 11.8–14.9)

## 2024-08-05 PROCEDURE — G0299 HHS/HOSPICE OF RN EA 15 MIN: HCPCS

## 2024-08-06 NOTE — HOME HEALTH
Skilled reason for visit: obtain pt/inr    Caregiver involvement: son available for adl care, errands, meals.    Medications reviewed and all medications are available in the home this visit.    The following education was provided regarding medications:  take all medications as prescribed.    MD notified of any discrepancies/look a-like medications/medication interactions: n/a  Medications are effective at this time.      Home health supplies by type and quantity ordered/delivered this visit include: n/a    Patient education provided this visit: assess for s/s of bleeding, reposition as tolerated    Sharps education provided: n/a    Patient level of understanding of education provided: verbalized understanding    Patient response to procedure performed:  no complaints     Agency Progress toward goals: improving - see interventinon tab    Patient's Progress towards personal goals: progressing    Home exercise program: repositioning and assess for s/s of bleeding    Continued need for the following skills: Nursing.    Plan for next visit: pt/inr    Patient and/or caregiver notified and agrees to changes in the Plan of Care: N/A.     The following discharge planning was discussed with the pt/caregiver: when patient is independent with disease process and medication managment

## 2024-08-12 ENCOUNTER — HOME CARE VISIT (OUTPATIENT)
Age: 84
End: 2024-08-12
Payer: MEDICARE

## 2024-08-12 LAB
INTERNATIONAL NORMALIZATION RATIO, POC: 2.5 (ref 0.9–1.1)
PROTHROMBIN TIME, POC: 29.5 (ref 11.8–14.9)

## 2024-08-12 PROCEDURE — G0299 HHS/HOSPICE OF RN EA 15 MIN: HCPCS

## 2024-08-14 NOTE — HOME HEALTH
Skilled reason for visit: obtain pt/inr - INR 2.5 PT 29.5 - patient reports she is taking 5mg on M,W,F,Mi and 7.5mg on T,T,Sa  Caregiver involvement: son available for adl care, errands, meals.   Medications reviewed and all medications are available in the home this visit.   The following education was provided regarding medications: take all medications as prescribed.   MD notified of any discrepancies/look a-like medications/medication interactions: n/a   Medications are effective at this time.   Home health supplies by type and quantity ordered/delivered this visit include: n/a   Patient education provided this visit: assess for s/s of bleeding, reposition as tolerated   Sharps education provided: n/a   Patient level of understanding of education provided: verbalized understanding   Patient response to procedure performed: no complaints   Agency Progress toward goals: improving - see interventinon tab   Patient's Progress towards personal goals: progressing   Home exercise program: repositioning and assess for s/s of bleeding   Continued need for the following skills: Nursing.   Plan for next visit: pt/inr and discharge 8/19  Patient and/or caregiver notified and agrees to changes in the Plan of Care: N/A.   The following discharge planning was discussed with the pt/caregiver: when patient is independent with disease process and medication managment

## 2024-08-19 ENCOUNTER — HOME CARE VISIT (OUTPATIENT)
Age: 84
End: 2024-08-19
Payer: MEDICARE

## 2024-08-19 PROCEDURE — G0299 HHS/HOSPICE OF RN EA 15 MIN: HCPCS

## 2024-08-22 ENCOUNTER — HOME CARE VISIT (OUTPATIENT)
Age: 84
End: 2024-08-22

## 2024-08-26 VITALS
SYSTOLIC BLOOD PRESSURE: 130 MMHG | HEART RATE: 70 BPM | TEMPERATURE: 97.9 F | DIASTOLIC BLOOD PRESSURE: 70 MMHG | RESPIRATION RATE: 18 BRPM

## 2024-08-27 NOTE — HOME HEALTH
Skilled reason for visit: obtain pt/inr - INR 2.5 - patient reports she is taking 5mg on M,W,F,Mi and 7.5mg on T,T,Sa . Patient and son both aware this is the last visit and the results would be given to Dr. Hernandez. Both agree and understand. Will continue to follow up with Dr. Hernandez office for future appts. Next coumadin check will be at the coumadin clinic.   Caregiver involvement: son available for adl care, errands, meals.   Medications reviewed and all medications are available in the home this visit.   The following education was provided regarding medications: take all medications as prescribed.   MD notified of any discrepancies/look a-like medications/medication interactions: n/a   Medications are effective at this time.   Home health supplies by type and quantity ordered/delivered this visit include: n/a   Patient education provided this visit: assess for s/s of bleeding, reposition as tolerated   Sharps education provided: n/a   Patient level of understanding of education provided: verbalized understanding   Patient response to procedure performed: no complaints   Agency Progress toward goals: improving - see interventinon tab   Patient's Progress towards personal goals: progressing   Home exercise program: repositioning and assess for s/s of bleeding   Continued need for the following skills: Nursing.   Plan for next visit: discharge today   Patient and/or caregiver notified and agrees to changes in the Plan of Care: N/A.   The following discharge planning was discussed with the pt/caregiver: when patient is independent with disease process and medication managment

## 2024-12-24 ENCOUNTER — APPOINTMENT (OUTPATIENT)
Facility: HOSPITAL | Age: 84
End: 2024-12-24
Payer: MEDICARE

## 2024-12-24 ENCOUNTER — HOSPITAL ENCOUNTER (INPATIENT)
Facility: HOSPITAL | Age: 84
LOS: 11 days | Discharge: HOME | End: 2025-01-04
Attending: EMERGENCY MEDICINE | Admitting: HOSPITALIST
Payer: MEDICARE

## 2024-12-24 PROBLEM — I10 PRIMARY HYPERTENSION: Status: ACTIVE | Noted: 2024-12-24

## 2024-12-24 PROBLEM — J96.01 ACUTE RESPIRATORY FAILURE WITH HYPOXIA: Status: ACTIVE | Noted: 2024-12-24

## 2024-12-24 PROBLEM — I42.9 CARDIOMYOPATHY (HCC): Status: ACTIVE | Noted: 2024-12-24

## 2024-12-24 PROBLEM — J96.01 ACUTE RESPIRATORY FAILURE WITH HYPOXEMIA: Status: ACTIVE | Noted: 2024-12-24

## 2024-12-24 PROBLEM — J44.1 COPD WITH ACUTE EXACERBATION (HCC): Status: ACTIVE | Noted: 2024-12-24

## 2024-12-24 PROBLEM — Z95.2 STATUS POST MECHANICAL AORTIC VALVE REPLACEMENT: Status: ACTIVE | Noted: 2024-12-24

## 2024-12-24 LAB
ALBUMIN SERPL-MCNC: 3.1 G/DL (ref 3.4–5)
ALBUMIN/GLOB SERPL: 0.9 (ref 0.8–1.7)
ALP SERPL-CCNC: 62 U/L (ref 45–117)
ALT SERPL-CCNC: 44 U/L (ref 13–56)
ANION GAP SERPL CALC-SCNC: 5 MMOL/L (ref 3–18)
APTT PPP: 44.6 SEC (ref 23–36.4)
AST SERPL-CCNC: 63 U/L (ref 10–38)
BASOPHILS # BLD: 0 K/UL (ref 0–0.1)
BASOPHILS NFR BLD: 0 % (ref 0–2)
BILIRUB SERPL-MCNC: 0.7 MG/DL (ref 0.2–1)
BUN SERPL-MCNC: 19 MG/DL (ref 7–18)
BUN/CREAT SERPL: 24 (ref 12–20)
CALCIUM SERPL-MCNC: 8.5 MG/DL (ref 8.5–10.1)
CHLORIDE SERPL-SCNC: 99 MMOL/L (ref 100–111)
CO2 SERPL-SCNC: 33 MMOL/L (ref 21–32)
CREAT SERPL-MCNC: 0.78 MG/DL (ref 0.6–1.3)
D DIMER PPP FEU-MCNC: 0.31 UG/ML(FEU)
DIFFERENTIAL METHOD BLD: ABNORMAL
EOSINOPHIL # BLD: 0 K/UL (ref 0–0.4)
EOSINOPHIL NFR BLD: 0 % (ref 0–5)
ERYTHROCYTE [DISTWIDTH] IN BLOOD BY AUTOMATED COUNT: 12.2 % (ref 11.6–14.5)
GLOBULIN SER CALC-MCNC: 3.4 G/DL (ref 2–4)
GLUCOSE SERPL-MCNC: 119 MG/DL (ref 74–99)
HCT VFR BLD AUTO: 39.1 % (ref 35–45)
HGB BLD-MCNC: 13 G/DL (ref 12–16)
IMM GRANULOCYTES # BLD AUTO: 0 K/UL (ref 0–0.04)
IMM GRANULOCYTES NFR BLD AUTO: 0 % (ref 0–0.5)
INR PPP: 1.9 (ref 0.9–1.1)
LACTATE SERPL-SCNC: 1 MMOL/L (ref 0.4–2)
LYMPHOCYTES # BLD: 0.7 K/UL (ref 0.9–3.6)
LYMPHOCYTES NFR BLD: 9 % (ref 21–52)
MCH RBC QN AUTO: 32.3 PG (ref 24–34)
MCHC RBC AUTO-ENTMCNC: 33.2 G/DL (ref 31–37)
MCV RBC AUTO: 97.3 FL (ref 78–100)
MONOCYTES # BLD: 0.3 K/UL (ref 0.05–1.2)
MONOCYTES NFR BLD: 4 % (ref 3–10)
NEUTS SEG # BLD: 5.9 K/UL (ref 1.8–8)
NEUTS SEG NFR BLD: 86 % (ref 40–73)
NRBC # BLD: 0 K/UL (ref 0–0.01)
NRBC BLD-RTO: 0 PER 100 WBC
PLATELET # BLD AUTO: 124 K/UL (ref 135–420)
PMV BLD AUTO: 10.7 FL (ref 9.2–11.8)
POTASSIUM SERPL-SCNC: 3 MMOL/L (ref 3.5–5.5)
PROT SERPL-MCNC: 6.5 G/DL (ref 6.4–8.2)
PROTHROMBIN TIME: 22.1 SEC (ref 11.9–14.9)
RBC # BLD AUTO: 4.02 M/UL (ref 4.2–5.3)
SODIUM SERPL-SCNC: 137 MMOL/L (ref 136–145)
TROPONIN I SERPL HS-MCNC: 121 NG/L (ref 0–54)
WBC # BLD AUTO: 6.9 K/UL (ref 4.6–13.2)

## 2024-12-24 PROCEDURE — 71045 X-RAY EXAM CHEST 1 VIEW: CPT

## 2024-12-24 PROCEDURE — 6370000000 HC RX 637 (ALT 250 FOR IP): Performed by: EMERGENCY MEDICINE

## 2024-12-24 PROCEDURE — 0202U NFCT DS 22 TRGT SARS-COV-2: CPT

## 2024-12-24 PROCEDURE — 94640 AIRWAY INHALATION TREATMENT: CPT

## 2024-12-24 PROCEDURE — 83605 ASSAY OF LACTIC ACID: CPT

## 2024-12-24 PROCEDURE — 6370000000 HC RX 637 (ALT 250 FOR IP): Performed by: INTERNAL MEDICINE

## 2024-12-24 PROCEDURE — 5A0945A ASSISTANCE WITH RESPIRATORY VENTILATION, 24-96 CONSECUTIVE HOURS, HIGH NASAL FLOW/VELOCITY: ICD-10-PCS | Performed by: HOSPITALIST

## 2024-12-24 PROCEDURE — 6360000002 HC RX W HCPCS: Performed by: INTERNAL MEDICINE

## 2024-12-24 PROCEDURE — 2700000000 HC OXYGEN THERAPY PER DAY

## 2024-12-24 PROCEDURE — 96375 TX/PRO/DX INJ NEW DRUG ADDON: CPT

## 2024-12-24 PROCEDURE — 2000000000 HC ICU R&B

## 2024-12-24 PROCEDURE — 87636 SARSCOV2 & INF A&B AMP PRB: CPT

## 2024-12-24 PROCEDURE — 85730 THROMBOPLASTIN TIME PARTIAL: CPT

## 2024-12-24 PROCEDURE — 87040 BLOOD CULTURE FOR BACTERIA: CPT

## 2024-12-24 PROCEDURE — 85025 COMPLETE CBC W/AUTO DIFF WBC: CPT

## 2024-12-24 PROCEDURE — 94669 MECHANICAL CHEST WALL OSCILL: CPT

## 2024-12-24 PROCEDURE — 85379 FIBRIN DEGRADATION QUANT: CPT

## 2024-12-24 PROCEDURE — 2500000003 HC RX 250 WO HCPCS: Performed by: EMERGENCY MEDICINE

## 2024-12-24 PROCEDURE — 85610 PROTHROMBIN TIME: CPT

## 2024-12-24 PROCEDURE — 71275 CT ANGIOGRAPHY CHEST: CPT

## 2024-12-24 PROCEDURE — 6360000002 HC RX W HCPCS: Performed by: EMERGENCY MEDICINE

## 2024-12-24 PROCEDURE — 2580000003 HC RX 258: Performed by: INTERNAL MEDICINE

## 2024-12-24 PROCEDURE — 2500000003 HC RX 250 WO HCPCS: Performed by: INTERNAL MEDICINE

## 2024-12-24 PROCEDURE — 2580000003 HC RX 258: Performed by: EMERGENCY MEDICINE

## 2024-12-24 PROCEDURE — 84484 ASSAY OF TROPONIN QUANT: CPT

## 2024-12-24 PROCEDURE — 99285 EMERGENCY DEPT VISIT HI MDM: CPT

## 2024-12-24 PROCEDURE — 80053 COMPREHEN METABOLIC PANEL: CPT

## 2024-12-24 PROCEDURE — 6360000004 HC RX CONTRAST MEDICATION: Performed by: EMERGENCY MEDICINE

## 2024-12-24 PROCEDURE — 96374 THER/PROPH/DIAG INJ IV PUSH: CPT

## 2024-12-24 PROCEDURE — 6370000000 HC RX 637 (ALT 250 FOR IP): Performed by: HOSPITALIST

## 2024-12-24 PROCEDURE — 93005 ELECTROCARDIOGRAM TRACING: CPT | Performed by: EMERGENCY MEDICINE

## 2024-12-24 RX ORDER — ARFORMOTEROL TARTRATE 15 UG/2ML
15 SOLUTION RESPIRATORY (INHALATION)
Status: DISCONTINUED | OUTPATIENT
Start: 2024-12-24 | End: 2024-12-29

## 2024-12-24 RX ORDER — CARVEDILOL 3.12 MG/1
3.12 TABLET ORAL 2 TIMES DAILY WITH MEALS
Status: DISCONTINUED | OUTPATIENT
Start: 2024-12-24 | End: 2025-01-04 | Stop reason: HOSPADM

## 2024-12-24 RX ORDER — 0.9 % SODIUM CHLORIDE 0.9 %
1000 INTRAVENOUS SOLUTION INTRAVENOUS ONCE
Status: COMPLETED | OUTPATIENT
Start: 2024-12-24 | End: 2024-12-24

## 2024-12-24 RX ORDER — ACETAMINOPHEN 650 MG/1
650 SUPPOSITORY RECTAL EVERY 6 HOURS PRN
Status: DISCONTINUED | OUTPATIENT
Start: 2024-12-24 | End: 2025-01-04 | Stop reason: HOSPADM

## 2024-12-24 RX ORDER — ONDANSETRON 4 MG/1
4 TABLET, ORALLY DISINTEGRATING ORAL EVERY 8 HOURS PRN
Status: DISCONTINUED | OUTPATIENT
Start: 2024-12-24 | End: 2025-01-04 | Stop reason: HOSPADM

## 2024-12-24 RX ORDER — SODIUM CHLORIDE 0.9 % (FLUSH) 0.9 %
5-40 SYRINGE (ML) INJECTION EVERY 12 HOURS SCHEDULED
Status: DISCONTINUED | OUTPATIENT
Start: 2024-12-24 | End: 2025-01-04 | Stop reason: HOSPADM

## 2024-12-24 RX ORDER — ONDANSETRON 2 MG/ML
4 INJECTION INTRAMUSCULAR; INTRAVENOUS EVERY 6 HOURS PRN
Status: DISCONTINUED | OUTPATIENT
Start: 2024-12-24 | End: 2025-01-04 | Stop reason: HOSPADM

## 2024-12-24 RX ORDER — IPRATROPIUM BROMIDE AND ALBUTEROL SULFATE 2.5; .5 MG/3ML; MG/3ML
3 SOLUTION RESPIRATORY (INHALATION)
Status: COMPLETED | OUTPATIENT
Start: 2024-12-24 | End: 2024-12-24

## 2024-12-24 RX ORDER — POTASSIUM CHLORIDE 29.8 MG/ML
20 INJECTION INTRAVENOUS PRN
Status: DISCONTINUED | OUTPATIENT
Start: 2024-12-24 | End: 2025-01-04 | Stop reason: HOSPADM

## 2024-12-24 RX ORDER — BUDESONIDE 0.5 MG/2ML
0.5 INHALANT ORAL
Status: DISCONTINUED | OUTPATIENT
Start: 2024-12-24 | End: 2024-12-29

## 2024-12-24 RX ORDER — SODIUM CHLORIDE 0.9 % (FLUSH) 0.9 %
5-40 SYRINGE (ML) INJECTION EVERY 12 HOURS SCHEDULED
Status: DISCONTINUED | OUTPATIENT
Start: 2024-12-24 | End: 2024-12-31

## 2024-12-24 RX ORDER — SODIUM CHLORIDE 9 MG/ML
INJECTION, SOLUTION INTRAVENOUS PRN
Status: DISCONTINUED | OUTPATIENT
Start: 2024-12-24 | End: 2025-01-04 | Stop reason: HOSPADM

## 2024-12-24 RX ORDER — POTASSIUM CHLORIDE 7.45 MG/ML
10 INJECTION INTRAVENOUS PRN
Status: DISCONTINUED | OUTPATIENT
Start: 2024-12-24 | End: 2025-01-04 | Stop reason: HOSPADM

## 2024-12-24 RX ORDER — WARFARIN SODIUM 2.5 MG/1
5 TABLET ORAL
Status: COMPLETED | OUTPATIENT
Start: 2024-12-24 | End: 2024-12-24

## 2024-12-24 RX ORDER — IPRATROPIUM BROMIDE AND ALBUTEROL SULFATE 2.5; .5 MG/3ML; MG/3ML
1 SOLUTION RESPIRATORY (INHALATION)
Status: DISCONTINUED | OUTPATIENT
Start: 2024-12-24 | End: 2024-12-26

## 2024-12-24 RX ORDER — POTASSIUM CHLORIDE 1500 MG/1
40 TABLET, EXTENDED RELEASE ORAL ONCE
Status: COMPLETED | OUTPATIENT
Start: 2024-12-24 | End: 2024-12-24

## 2024-12-24 RX ORDER — VANCOMYCIN 1.5 G/300ML
1500 INJECTION, SOLUTION INTRAVENOUS ONCE
Status: DISCONTINUED | OUTPATIENT
Start: 2024-12-24 | End: 2024-12-24 | Stop reason: SDUPTHER

## 2024-12-24 RX ORDER — PANTOPRAZOLE SODIUM 40 MG/1
40 TABLET, DELAYED RELEASE ORAL
Status: DISCONTINUED | OUTPATIENT
Start: 2024-12-25 | End: 2025-01-04 | Stop reason: HOSPADM

## 2024-12-24 RX ORDER — MAGNESIUM SULFATE IN WATER 40 MG/ML
2000 INJECTION, SOLUTION INTRAVENOUS
Status: COMPLETED | OUTPATIENT
Start: 2024-12-24 | End: 2024-12-24

## 2024-12-24 RX ORDER — SODIUM CHLORIDE 0.9 % (FLUSH) 0.9 %
5-40 SYRINGE (ML) INJECTION PRN
Status: DISCONTINUED | OUTPATIENT
Start: 2024-12-24 | End: 2024-12-31

## 2024-12-24 RX ORDER — ATORVASTATIN CALCIUM 20 MG/1
40 TABLET, FILM COATED ORAL DAILY
Status: DISCONTINUED | OUTPATIENT
Start: 2024-12-24 | End: 2025-01-04 | Stop reason: HOSPADM

## 2024-12-24 RX ORDER — MAGNESIUM SULFATE IN WATER 40 MG/ML
2000 INJECTION, SOLUTION INTRAVENOUS PRN
Status: DISCONTINUED | OUTPATIENT
Start: 2024-12-24 | End: 2025-01-04 | Stop reason: HOSPADM

## 2024-12-24 RX ORDER — POLYETHYLENE GLYCOL 3350 17 G/17G
17 POWDER, FOR SOLUTION ORAL DAILY PRN
Status: DISCONTINUED | OUTPATIENT
Start: 2024-12-24 | End: 2025-01-04 | Stop reason: HOSPADM

## 2024-12-24 RX ORDER — ACETAMINOPHEN 325 MG/1
650 TABLET ORAL EVERY 6 HOURS PRN
Status: DISCONTINUED | OUTPATIENT
Start: 2024-12-24 | End: 2025-01-04 | Stop reason: HOSPADM

## 2024-12-24 RX ORDER — SODIUM CHLORIDE 0.9 % (FLUSH) 0.9 %
5-40 SYRINGE (ML) INJECTION PRN
Status: DISCONTINUED | OUTPATIENT
Start: 2024-12-24 | End: 2025-01-04 | Stop reason: HOSPADM

## 2024-12-24 RX ORDER — ACETAMINOPHEN 325 MG/1
650 TABLET ORAL
Status: COMPLETED | OUTPATIENT
Start: 2024-12-24 | End: 2024-12-24

## 2024-12-24 RX ORDER — VANCOMYCIN 1.5 G/300ML
1500 INJECTION, SOLUTION INTRAVENOUS EVERY 24 HOURS
Status: DISCONTINUED | OUTPATIENT
Start: 2024-12-25 | End: 2024-12-25

## 2024-12-24 RX ORDER — ACETAMINOPHEN 650 MG/1
650 SUPPOSITORY RECTAL
Status: DISCONTINUED | OUTPATIENT
Start: 2024-12-24 | End: 2024-12-24

## 2024-12-24 RX ORDER — IOPAMIDOL 755 MG/ML
100 INJECTION, SOLUTION INTRAVASCULAR
Status: COMPLETED | OUTPATIENT
Start: 2024-12-24 | End: 2024-12-24

## 2024-12-24 RX ORDER — VANCOMYCIN 1.25 G/250ML
25 INJECTION, SOLUTION INTRAVENOUS ONCE
Status: COMPLETED | OUTPATIENT
Start: 2024-12-24 | End: 2024-12-24

## 2024-12-24 RX ORDER — VANCOMYCIN 1.5 G/300ML
1500 INJECTION, SOLUTION INTRAVENOUS EVERY 24 HOURS
Status: DISCONTINUED | OUTPATIENT
Start: 2024-12-25 | End: 2024-12-24

## 2024-12-24 RX ADMIN — IOPAMIDOL 100 ML: 755 INJECTION, SOLUTION INTRAVENOUS at 16:58

## 2024-12-24 RX ADMIN — VANCOMYCIN 1750 MG: 1.25 INJECTION, SOLUTION INTRAVENOUS at 18:39

## 2024-12-24 RX ADMIN — POTASSIUM BICARBONATE 40 MEQ: 782 TABLET, EFFERVESCENT ORAL at 19:42

## 2024-12-24 RX ADMIN — MAGNESIUM SULFATE HEPTAHYDRATE 2000 MG: 40 INJECTION, SOLUTION INTRAVENOUS at 16:00

## 2024-12-24 RX ADMIN — WATER 40 MG: 1 INJECTION INTRAMUSCULAR; INTRAVENOUS; SUBCUTANEOUS at 23:29

## 2024-12-24 RX ADMIN — SODIUM CHLORIDE 1000 ML: 9 INJECTION, SOLUTION INTRAVENOUS at 15:55

## 2024-12-24 RX ADMIN — BUDESONIDE 500 MCG: 0.5 INHALANT RESPIRATORY (INHALATION) at 21:16

## 2024-12-24 RX ADMIN — IPRATROPIUM BROMIDE AND ALBUTEROL SULFATE 3 DOSE: .5; 2.5 SOLUTION RESPIRATORY (INHALATION) at 14:42

## 2024-12-24 RX ADMIN — ACETAMINOPHEN 650 MG: 325 TABLET ORAL at 15:49

## 2024-12-24 RX ADMIN — ARFORMOTEROL TARTRATE 15 MCG: 15 SOLUTION RESPIRATORY (INHALATION) at 21:16

## 2024-12-24 RX ADMIN — AZITHROMYCIN MONOHYDRATE 500 MG: 500 INJECTION, POWDER, LYOPHILIZED, FOR SOLUTION INTRAVENOUS at 20:48

## 2024-12-24 RX ADMIN — POTASSIUM CHLORIDE 40 MEQ: 1500 TABLET, EXTENDED RELEASE ORAL at 19:42

## 2024-12-24 RX ADMIN — IPRATROPIUM BROMIDE AND ALBUTEROL SULFATE 1 DOSE: .5; 2.5 SOLUTION RESPIRATORY (INHALATION) at 21:16

## 2024-12-24 RX ADMIN — WARFARIN SODIUM 5 MG: 2.5 TABLET ORAL at 23:29

## 2024-12-24 RX ADMIN — ATORVASTATIN CALCIUM 40 MG: 20 TABLET, FILM COATED ORAL at 23:29

## 2024-12-24 RX ADMIN — WATER 40 MG: 1 INJECTION INTRAMUSCULAR; INTRAVENOUS; SUBCUTANEOUS at 19:42

## 2024-12-24 RX ADMIN — CEFEPIME 2000 MG: 2 INJECTION, POWDER, FOR SOLUTION INTRAVENOUS at 15:49

## 2024-12-24 ASSESSMENT — PAIN SCALES - GENERAL: PAINLEVEL_OUTOF10: 0

## 2024-12-24 ASSESSMENT — PAIN - FUNCTIONAL ASSESSMENT: PAIN_FUNCTIONAL_ASSESSMENT: NONE - DENIES PAIN

## 2024-12-24 NOTE — ED PROVIDER NOTES
EMERGENCY DEPARTMENT HISTORY AND PHYSICAL EXAM    1:48 PM      Date: 12/24/2024  Patient Name: Ritika Clancy    History of Presenting Illness     No chief complaint on file.      History From: Patient and ems    Ritika Clancy is a 84 y.o. female   84-year-old female with past medical history of asthma resents to the ER with 1 week of worsening cough.  When EMS arrived to the house patient was saturating at 75% on room air.  Patient received a dose of Solu-Medrol and was started on BiPAP but then patient's pressure became low so BiPAP was discontinued and patient was on nonrebreather saturating at 100%.  Patient was also febrile in the field.  Patient states that she broke her femur left and since the break she has not been able to walk.    Patient denies chest pain  Denies lower extremity swelling  Denies any other acute complaints at this time           Nursing Notes were all reviewed and agreed with or any disagreements were addressed in the HPI.    PCP: Ronaldo Hernandez II, DO    Current Facility-Administered Medications   Medication Dose Route Frequency Provider Last Rate Last Admin    sodium chloride flush 0.9 % injection 5-40 mL  5-40 mL IntraVENous 2 times per day Denis Castañeda MD        sodium chloride flush 0.9 % injection 5-40 mL  5-40 mL IntraVENous PRN Denis Castañeda MD        0.9 % sodium chloride infusion   IntraVENous PRN Denis Castañeda MD        ceFEPIme (MAXIPIME) 2,000 mg in sterile water 20 mL IV syringe  2,000 mg IntraVENous Once Denis Castañeda MD        vancomycin (VANCOCIN) 1750 mg in 350 mL IVPB  25 mg/kg IntraVENous Once Denis Castañeda MD        magnesium sulfate 2000 mg in 50 mL IVPB premix  2,000 mg IntraVENous NOW Denis Castañeda MD        ipratropium 0.5 mg-albuterol 2.5 mg (DUONEB) nebulizer solution 3 Dose  3 Dose Inhalation NOW Denis Castañeda MD         Current Outpatient Medications   Medication Sig Dispense Refill    hydroCHLOROthiazide (HYDRODIURIL) 25 MG tablet Take 25 mg by mouth

## 2024-12-24 NOTE — ED NOTES
Talked to grand daughter. Grand daughter has concerns about care at home. Sts she feels like she needs help with adls. Would like care management to assess prior to discharge

## 2024-12-24 NOTE — PROGRESS NOTES
Pharmacy Routine Monitoring of Warfarin (INR)  Active order for anticoagulants/antiplatelets: Warfarin  Significant drug interactions: macrolides (incr, INR)  Goal INR: 2 - 3  Recent Labs     12/24/24  1505 12/24/24  1529   INR  --  1.9*   HGB 13.0  --      Date INR Dose  12/24     1.9         Warfarin 5 mg ordered for 12/24 at 1915   Recent warfarin administrations        No warfarin orders with administrations found.            Orders not given:            warfarin (COUMADIN) tablet 5 mg    warfarin placeholder: dosing by pharmacy                  Assessment/Plan:       INR orders are placed.    Pharmacy will continue to monitor       Thank you for the consult,   Bianca Rolon

## 2024-12-24 NOTE — PROGRESS NOTES
[facility], [place]   Pharmacy Pharmacokinetic Monitoring Service - Vancomycin     Rtiika Clancy is a 84 y.o. female starting on vancomycin therapy for Pneumonia (nosocomial). Pharmacy consulted by Dr. Olivia for monitoring and adjustment.    Target Concentration: Goal AUC/HILARIO 400-600 mg*hr/L    Additional Antimicrobials: Ceftriaxone, Azithromycin    Pertinent Laboratory Values:   Wt Readings from Last 1 Encounters:   12/24/24 68 kg (150 lb)     Temp Readings from Last 1 Encounters:   12/24/24 (!) 101.8 °F (38.8 °C) (Rectal)     Estimated Creatinine Clearance: 52 mL/min (based on SCr of 0.78 mg/dL).  Recent Labs     12/24/24  1505   CREATININE 0.78   BUN 19*   WBC 6.9         Pertinent Cultures:  Culture Date Source Results   12/24 Blood x 2 IP   MRSA Nasal Swab: not ordered. Order placed by pharmacy.    Plan:  Dosing recommendations based on Bayesian software  Start vancomycin 1,750 mg IV x 1 loading dose, followed by Vancomycin 1,500 mg IV every 24 hours  Anticipated AUC of 569 mg/L.hr and trough concentration of 16.5 mg/L at steady state  Renal labs as indicated   Vancomycin concentration not ordered at this time   Pharmacy will continue to monitor patient and adjust therapy as indicated    Thank you for the consult,  ELROY TORRES RPH, PharmD  12/24/2024 6:10 PM

## 2024-12-24 NOTE — ED NOTES
Son in room with patient.   Attempted to return call to patients granddaughter with patients permission to give update. No answer unable to leave voicemail.

## 2024-12-24 NOTE — CONSULTS
intravenous administration of nonionic contrast 100 mL of isovue 370 according to departmental PE protocol. Coronal and sagittal reformats were performed. 3D post processing was performed.  CT dose reduction was achieved through the use of a standardized protocol tailored for this examination and automatic exposure control for dose modulation. FINDINGS: This is a good quality study for the evaluation of pulmonary embolism to the first subsegmental arterial level. There is no pulmonary embolism to this level. Thyroid hypodensities median sternotomy changes. MEDIASTINUM: No mass or lymphadenopathy. MARZENA: No mass or lymphadenopathy. THORACIC AORTA: No aneurysm. HEART: Normal in size. ESOPHAGUS: Cardiomegaly. No coronary calcifications are identified TRACHEA/BRONCHI: Mucous plugging in the right lower lobe and right middle lobe bronchi PLEURA: No effusion or pneumothorax. LUNGS: Emphysema. Airspace opacity likely volume loss in the right middle lobe UPPER ABDOMEN: Unremarkable BONES: Superior endplate compression deformity of T11 of uncertain age.   1. No evidence of pulmonary embolus. 2. Mucous plug in the right middle lobe and right lower lobe with right middle lobe airspace opacity likely volume loss. Electronically signed by Frankie Kerr      XR CHEST PORTABLE  Result Date: 12/24/2024  EXAM: XR CHEST PORTABLE ACC#: [id number]  INDICATION: cough, hypoxia to 70%  COMPARISON: None. TECHNIQUE: AP portable semierect view of the chest. FINDINGS: Lungs are clear. The cardiomediastinal configuration is within normal limits. No acute bony abnormalities. Median sternotomy wires are visualized.   No acute cardiopulmonary abnormalities. Electronically signed by ADENIKE Jorge       ECHO 6/3/2024  [place] Echo: Complete with Contrast Protocol  Narrative    Left Ventricle: Left ventricle size is normal. Normal wall thickness .  There is abnormal septal motion. The ejection fraction by visual  approximation is 45 - 50%. There is  mildly reduced systolic dysfunction.  Unable to assess diastology due to poor image quality.    Right Ventricle: Right ventricle size is normal. Normal systolic  function.    Left Atrium: Left atrium size is normal.    Aortic Valve: Mechanical valve. No stenosis. Normal prosthetic  gradient. AV mean gradient is 6.00 mmHg. AV peak gradient is 12.53 mmHg.  No transvalvular regurgitation. No paravalvular regurgitation.    Tricuspid Valve: Mild to moderate (1-2+) transvalvular regurgitation  with a centrally directed jet. Mild pulmonary artery hypertension. RVSP is  36.0 mmHg.          Please note: Voice-recognition software may have been used to generate this report, which may have resulted in some phonetic-based errors in grammar and contents. Even though attempts were made to correct all the mistakes, some may have been missed, and remained in the body of the document.      Norman Olivia MD  12/24/2024

## 2024-12-24 NOTE — ED TRIAGE NOTES
PT BIBA NN9 c/o diff breathing x1 week. Per EMS, worsening symptoms, hx of asthma. Per EMS, pt was on bipap en route. Pt has cough and arrived on non rebreather due to pt being on 70-80 on room air. 100% with nonrebreather,125 solumedrol given en route, pressure dropped en route, pt removed from bipap and placed on nonrebreather.

## 2024-12-25 LAB
ALBUMIN SERPL-MCNC: 2.8 G/DL (ref 3.4–5)
ALBUMIN/GLOB SERPL: 0.8 (ref 0.8–1.7)
ALP SERPL-CCNC: 51 U/L (ref 45–117)
ALT SERPL-CCNC: 39 U/L (ref 13–56)
ANION GAP SERPL CALC-SCNC: 4 MMOL/L (ref 3–18)
AST SERPL-CCNC: 47 U/L (ref 10–38)
B PERT DNA SPEC QL NAA+PROBE: NOT DETECTED
BASOPHILS # BLD: 0 K/UL (ref 0–0.1)
BASOPHILS NFR BLD: 0 % (ref 0–2)
BILIRUB SERPL-MCNC: 0.5 MG/DL (ref 0.2–1)
BORDETELLA PARAPERTUSSIS BY PCR: NOT DETECTED
BUN SERPL-MCNC: 17 MG/DL (ref 7–18)
BUN/CREAT SERPL: 25 (ref 12–20)
C PNEUM DNA SPEC QL NAA+PROBE: NOT DETECTED
CALCIUM SERPL-MCNC: 8.6 MG/DL (ref 8.5–10.1)
CHLORIDE SERPL-SCNC: 105 MMOL/L (ref 100–111)
CO2 SERPL-SCNC: 32 MMOL/L (ref 21–32)
CREAT SERPL-MCNC: 0.69 MG/DL (ref 0.6–1.3)
DIFFERENTIAL METHOD BLD: ABNORMAL
EKG ATRIAL RATE: 81 BPM
EKG DIAGNOSIS: NORMAL
EKG P AXIS: 58 DEGREES
EKG P-R INTERVAL: 162 MS
EKG Q-T INTERVAL: 408 MS
EKG QRS DURATION: 76 MS
EKG QTC CALCULATION (BAZETT): 473 MS
EKG R AXIS: -50 DEGREES
EKG T AXIS: -40 DEGREES
EKG VENTRICULAR RATE: 81 BPM
EOSINOPHIL # BLD: 0 K/UL (ref 0–0.4)
EOSINOPHIL NFR BLD: 0 % (ref 0–5)
ERYTHROCYTE [DISTWIDTH] IN BLOOD BY AUTOMATED COUNT: 12 % (ref 11.6–14.5)
FLUAV H1 2009 PAND RNA SPEC QL NAA+PROBE: DETECTED
FLUAV RNA SPEC QL NAA+PROBE: DETECTED
FLUBV RNA SPEC QL NAA+PROBE: NOT DETECTED
FLUBV RNA SPEC QL NAA+PROBE: NOT DETECTED
GLOBULIN SER CALC-MCNC: 3.7 G/DL (ref 2–4)
GLUCOSE SERPL-MCNC: 127 MG/DL (ref 74–99)
HADV DNA SPEC QL NAA+PROBE: NOT DETECTED
HCOV 229E RNA SPEC QL NAA+PROBE: NOT DETECTED
HCOV HKU1 RNA SPEC QL NAA+PROBE: NOT DETECTED
HCOV NL63 RNA SPEC QL NAA+PROBE: NOT DETECTED
HCOV OC43 RNA SPEC QL NAA+PROBE: NOT DETECTED
HCT VFR BLD AUTO: 36.6 % (ref 35–45)
HGB BLD-MCNC: 12.1 G/DL (ref 12–16)
HMPV RNA SPEC QL NAA+PROBE: NOT DETECTED
HPIV1 RNA SPEC QL NAA+PROBE: NOT DETECTED
HPIV2 RNA SPEC QL NAA+PROBE: NOT DETECTED
HPIV3 RNA SPEC QL NAA+PROBE: NOT DETECTED
HPIV4 RNA SPEC QL NAA+PROBE: NOT DETECTED
IMM GRANULOCYTES # BLD AUTO: 0 K/UL (ref 0–0.04)
IMM GRANULOCYTES NFR BLD AUTO: 1 % (ref 0–0.5)
INR PPP: 2.3 (ref 0.9–1.1)
LYMPHOCYTES # BLD: 0.8 K/UL (ref 0.9–3.6)
LYMPHOCYTES NFR BLD: 22 % (ref 21–52)
M PNEUMO DNA SPEC QL NAA+PROBE: NOT DETECTED
MAGNESIUM SERPL-MCNC: 2.3 MG/DL (ref 1.6–2.6)
MCH RBC QN AUTO: 32.4 PG (ref 24–34)
MCHC RBC AUTO-ENTMCNC: 33.1 G/DL (ref 31–37)
MCV RBC AUTO: 98.1 FL (ref 78–100)
MONOCYTES # BLD: 0.2 K/UL (ref 0.05–1.2)
MONOCYTES NFR BLD: 6 % (ref 3–10)
NEUTS SEG # BLD: 2.5 K/UL (ref 1.8–8)
NEUTS SEG NFR BLD: 72 % (ref 40–73)
NRBC # BLD: 0 K/UL (ref 0–0.01)
NRBC BLD-RTO: 0 PER 100 WBC
PHOSPHATE SERPL-MCNC: 2 MG/DL (ref 2.5–4.9)
PLATELET # BLD AUTO: 112 K/UL (ref 135–420)
PMV BLD AUTO: 10.9 FL (ref 9.2–11.8)
POTASSIUM SERPL-SCNC: 3.9 MMOL/L (ref 3.5–5.5)
PROT SERPL-MCNC: 6.5 G/DL (ref 6.4–8.2)
PROTHROMBIN TIME: 25.4 SEC (ref 11.9–14.9)
RBC # BLD AUTO: 3.73 M/UL (ref 4.2–5.3)
RSV RNA SPEC QL NAA+PROBE: NOT DETECTED
RV+EV RNA SPEC QL NAA+PROBE: NOT DETECTED
SARS-COV-2 RNA RESP QL NAA+PROBE: NOT DETECTED
SARS-COV-2 RNA RESP QL NAA+PROBE: NOT DETECTED
SODIUM SERPL-SCNC: 141 MMOL/L (ref 136–145)
SOURCE: ABNORMAL
TROPONIN I SERPL HS-MCNC: 45 NG/L (ref 0–54)
TROPONIN I SERPL HS-MCNC: 50 NG/L (ref 0–54)
WBC # BLD AUTO: 3.5 K/UL (ref 4.6–13.2)

## 2024-12-25 PROCEDURE — 2500000003 HC RX 250 WO HCPCS: Performed by: EMERGENCY MEDICINE

## 2024-12-25 PROCEDURE — 2500000003 HC RX 250 WO HCPCS: Performed by: INTERNAL MEDICINE

## 2024-12-25 PROCEDURE — 6370000000 HC RX 637 (ALT 250 FOR IP): Performed by: HOSPITALIST

## 2024-12-25 PROCEDURE — 6360000002 HC RX W HCPCS: Performed by: INTERNAL MEDICINE

## 2024-12-25 PROCEDURE — 85025 COMPLETE CBC W/AUTO DIFF WBC: CPT

## 2024-12-25 PROCEDURE — 80053 COMPREHEN METABOLIC PANEL: CPT

## 2024-12-25 PROCEDURE — 85610 PROTHROMBIN TIME: CPT

## 2024-12-25 PROCEDURE — 2580000003 HC RX 258: Performed by: HOSPITALIST

## 2024-12-25 PROCEDURE — 94640 AIRWAY INHALATION TREATMENT: CPT

## 2024-12-25 PROCEDURE — 83735 ASSAY OF MAGNESIUM: CPT

## 2024-12-25 PROCEDURE — 2500000003 HC RX 250 WO HCPCS: Performed by: HOSPITALIST

## 2024-12-25 PROCEDURE — 6370000000 HC RX 637 (ALT 250 FOR IP): Performed by: INTERNAL MEDICINE

## 2024-12-25 PROCEDURE — 2580000003 HC RX 258: Performed by: INTERNAL MEDICINE

## 2024-12-25 PROCEDURE — 2700000000 HC OXYGEN THERAPY PER DAY

## 2024-12-25 PROCEDURE — 84100 ASSAY OF PHOSPHORUS: CPT

## 2024-12-25 PROCEDURE — 94669 MECHANICAL CHEST WALL OSCILL: CPT

## 2024-12-25 PROCEDURE — 84484 ASSAY OF TROPONIN QUANT: CPT

## 2024-12-25 PROCEDURE — 2000000000 HC ICU R&B

## 2024-12-25 RX ORDER — WARFARIN SODIUM 1 MG/1
2 TABLET ORAL
Status: COMPLETED | OUTPATIENT
Start: 2024-12-25 | End: 2024-12-25

## 2024-12-25 RX ADMIN — IPRATROPIUM BROMIDE AND ALBUTEROL SULFATE 1 DOSE: .5; 2.5 SOLUTION RESPIRATORY (INHALATION) at 20:10

## 2024-12-25 RX ADMIN — WATER 40 MG: 1 INJECTION INTRAMUSCULAR; INTRAVENOUS; SUBCUTANEOUS at 13:33

## 2024-12-25 RX ADMIN — IPRATROPIUM BROMIDE AND ALBUTEROL SULFATE 1 DOSE: .5; 2.5 SOLUTION RESPIRATORY (INHALATION) at 04:50

## 2024-12-25 RX ADMIN — ARFORMOTEROL TARTRATE 15 MCG: 15 SOLUTION RESPIRATORY (INHALATION) at 07:40

## 2024-12-25 RX ADMIN — WATER 40 MG: 1 INJECTION INTRAMUSCULAR; INTRAVENOUS; SUBCUTANEOUS at 06:30

## 2024-12-25 RX ADMIN — ONDANSETRON 4 MG: 4 TABLET, ORALLY DISINTEGRATING ORAL at 18:03

## 2024-12-25 RX ADMIN — BUDESONIDE 500 MCG: 0.5 INHALANT RESPIRATORY (INHALATION) at 20:10

## 2024-12-25 RX ADMIN — Medication: at 19:04

## 2024-12-25 RX ADMIN — WARFARIN SODIUM 2 MG: 1 TABLET ORAL at 18:21

## 2024-12-25 RX ADMIN — WATER 40 MG: 1 INJECTION INTRAMUSCULAR; INTRAVENOUS; SUBCUTANEOUS at 18:02

## 2024-12-25 RX ADMIN — SODIUM CHLORIDE, PRESERVATIVE FREE 10 ML: 5 INJECTION INTRAVENOUS at 06:31

## 2024-12-25 RX ADMIN — IPRATROPIUM BROMIDE AND ALBUTEROL SULFATE 1 DOSE: .5; 2.5 SOLUTION RESPIRATORY (INHALATION) at 00:48

## 2024-12-25 RX ADMIN — CARVEDILOL 3.12 MG: 3.12 TABLET, FILM COATED ORAL at 18:21

## 2024-12-25 RX ADMIN — IPRATROPIUM BROMIDE AND ALBUTEROL SULFATE 1 DOSE: .5; 2.5 SOLUTION RESPIRATORY (INHALATION) at 13:16

## 2024-12-25 RX ADMIN — IPRATROPIUM BROMIDE AND ALBUTEROL SULFATE 1 DOSE: .5; 2.5 SOLUTION RESPIRATORY (INHALATION) at 07:40

## 2024-12-25 RX ADMIN — SODIUM CHLORIDE, PRESERVATIVE FREE 10 ML: 5 INJECTION INTRAVENOUS at 09:02

## 2024-12-25 RX ADMIN — BUDESONIDE 500 MCG: 0.5 INHALANT RESPIRATORY (INHALATION) at 07:40

## 2024-12-25 RX ADMIN — WATER 2000 MG: 1 INJECTION INTRAMUSCULAR; INTRAVENOUS; SUBCUTANEOUS at 01:44

## 2024-12-25 RX ADMIN — ATORVASTATIN CALCIUM 40 MG: 20 TABLET, FILM COATED ORAL at 09:20

## 2024-12-25 RX ADMIN — WATER 2000 MG: 1 INJECTION INTRAMUSCULAR; INTRAVENOUS; SUBCUTANEOUS at 13:34

## 2024-12-25 RX ADMIN — SODIUM PHOSPHATE, MONOBASIC, MONOHYDRATE AND SODIUM PHOSPHATE, DIBASIC, ANHYDROUS 15 MMOL: 276; 142 INJECTION, SOLUTION INTRAVENOUS at 06:31

## 2024-12-25 RX ADMIN — AZITHROMYCIN MONOHYDRATE 500 MG: 500 INJECTION, POWDER, LYOPHILIZED, FOR SOLUTION INTRAVENOUS at 18:07

## 2024-12-25 RX ADMIN — IPRATROPIUM BROMIDE AND ALBUTEROL SULFATE 1 DOSE: .5; 2.5 SOLUTION RESPIRATORY (INHALATION) at 16:39

## 2024-12-25 RX ADMIN — SODIUM CHLORIDE, PRESERVATIVE FREE 10 ML: 5 INJECTION INTRAVENOUS at 09:03

## 2024-12-25 RX ADMIN — PANTOPRAZOLE SODIUM 40 MG: 40 TABLET, DELAYED RELEASE ORAL at 09:09

## 2024-12-25 RX ADMIN — ARFORMOTEROL TARTRATE 15 MCG: 15 SOLUTION RESPIRATORY (INHALATION) at 20:07

## 2024-12-25 RX ADMIN — SODIUM CHLORIDE, PRESERVATIVE FREE 10 ML: 5 INJECTION INTRAVENOUS at 06:32

## 2024-12-25 ASSESSMENT — PAIN SCALES - GENERAL
PAINLEVEL_OUTOF10: 0

## 2024-12-25 NOTE — PROGRESS NOTES
Warfarin Dosing - Pharmacy Consult Note  Consulting Provider: Dr. Sevilla    Indication:  Mechanical Heart Valve (atrial)  Warfarin Dose prior to admission: 5 mg po daily      Concurrent anticoagulants/antiplatelets: none  Significant Drug Interactions: macrolides (incr, INR)    Recent Labs     12/24/24  1505 12/24/24  1529 12/25/24  0509   INR  --  1.9* 2.3*   HGB 13.0  --  12.1   *  --  112*     Recent warfarin administrations                     warfarin (COUMADIN) tablet 5 mg (mg) 5 mg Given 12/24/24 5250                   Assessment/Plan  (Goal INR: 2 - 3)  Warfarin 2 mg po ordered for [employer] @ 1800    Active problem list reviewed.  INR orders are placed.  Chart reviewed for pertinent labs, drug/diet interactions, and past doses.  Documentation of patient's clinical condition was reviewed.    Pharmacy Dosing:  Pharmacy will continue to follow.

## 2024-12-25 NOTE — H&P
Hospitalist History & Physical    Patient: Ritika Clancy MRN: [id number]  [facility]: [id number]    YOB: 1940  Age: 84 y.o.  Sex: female      DOA: 12/24/2024  Primary Care Provider:  Ronaldo Hernandez II, DO      Assessment/Plan     Active Hospital Problems    Diagnosis     Acute respiratory failure with hypoxemia [J96.01]     COPD with acute exacerbation (HCC) [J44.1]     Primary hypertension [I10]     Status post mechanical aortic valve replacement [Z95.2]     Cardiomyopathy (HCC) [I42.9]     Acute respiratory failure with hypoxia [J96.01]        Admit to ICU    CRITICAL CARE PLAN    Resp   Acute respiratory failure with hypoxia  Continue on high flow oxygen  CTA chest no PE, showed mucous plugging right middle lobe and right lower lobe with right middle lobe airspace opacity likely volume loss.  Started on Solu-Medrol, Brovana and Pulmicort.  DuoNeb as needed  Incentive spirometry.  PCCM following    ID   Follow up blood  cx.  Follow respiratory viral panel  Urine Legionella and strep pneumo antigen  ANTIBIOTICS vancomycin, ceftriaxone, azithromycin.   Trend temps, wbc, LA improving.    CVS   Monitor HD.   Cardiomyopathy  Last echo June 2024 with EF of 45 to 50%, mildly decreased left ventricular systolic function.  Status post aortic valve replacement, mechanical valve.  On anticoagulation with warfarin.  Pharmacy to dose.    Heme/onc   Follow H&H, plts. INR.    Renal   Trend BUN, Cr, follow I/O. Check and replace Mg, K, phos.    Endocrine   Follow FSG    CNS  Neuro  Stable mental status    GI   Cardiac diet    Prophylaxis - DVT: heparin, GI: protonix     DVT Prophylaxis:  []Lovenox SQ  [] Heparin SQ  [] SCDs  [x] Coumadin, Eliquis, Xarelto,   [] On Heparin gtt or therapeutic Lovenox. [] Patient refused.     [id number]  40 minutes of critical care time spent in the direct evaluation and treatment of this high risk patient. The reason for providing this level of medical care for this critically ill

## 2024-12-25 NOTE — PROGRESS NOTES
[facility]  Pulmonary, Critical Care, and Sleep Medicine    Name: Ritika Clancy MRN: [id number]   : 1940 Hospital: [facility]    Date: 2024  Room: [facility]/[address] [place] [address] Note                                                                             Consult requesting physician: Darron Seth PA-C   Reason for Consult: COPD exacerbation    IMPRESSION:     Acute respiratory failure with hypoxemia  COPD exacerbation  HTN  Cardiomyopathy  Poor mobility  S/p mechanical aortic valve replacement      Active Hospital Problems    Diagnosis Date Noted    Acute respiratory failure with hypoxemia [J96.01] 2024    COPD with acute exacerbation (HCC) [J44.1] 2024    Primary hypertension [I10] 2024    Status post mechanical aortic valve replacement [Z95.2] 2024    Cardiomyopathy (HCC) [I42.9] 2024    Acute respiratory failure with hypoxia [J96.01] 2024         Code status: Full Code       RECOMMENDATIONS:     Patient with known emphysema/COPD, presenting with COPD exacerbation.  Influenza A-positive    Respiratory: Patient on high flow nasal cannula oxygen-flow 30 L, FiO2 35%.  Wean oxygen as tolerated.  CTA chest reviewed images and report-bilateral upper lobe centrilobular emphysematous changes; no focal consolidation; bilateral basal bronchial obstruction with mucus, and similar findings in right middle lobe bronchial airways as well; right middle lobe atelectasis noted in the lateral segments; no pleural effusions; no central or segmental PEs; enlarged central pulmonary artery suggestive of pulmonary hypertension.  Plan for follow-up chest x-ray tomorrow.  Bronchodilators-Brovana and budesonide nebs twice a day.  Ipratropium/albuterol nebs 4-5 times daily.  IV steroids-Solu-Medrol 40 mg every 6 hours-continue for now.  Incentive spirometry, Pep/flutter valve therapy.   Keep SPO2 >=92%. HOB 30 degree elevation all the time. Aggressive  generate this report, which may have resulted in some phonetic-based errors in grammar and contents. Even though attempts were made to correct all the mistakes, some may have been missed, and remained in the body of the document.      Norman Olivia MD  12/25/2024

## 2024-12-25 NOTE — ED NOTES
Report given to MISA Mauro.      Patient information discussed and reviewed per facility protocol.      Outstanding orders reviewed (if applicable).     No applicable questions at this time.      Will sign off from patient.

## 2024-12-25 NOTE — PROGRESS NOTES
[facility]   Pharmacy Pharmacokinetic Monitoring Service - Vancomycin    Consulting Provider: Dr. Olivia   Indication: Pneumonia (aspiration) x 7 days  Target Concentration: Goal AUC/HILARIO 400-600 mg*hr/L  Day of Therapy: 2  Additional Antimicrobials: Azithromycin + Rocephin    Pertinent Laboratory Values:   Wt Readings from Last 1 Encounters:   12/24/24 68 kg (150 lb)     Temp Readings from Last 1 Encounters:   12/25/24 97.8 °F (36.6 °C) (Oral)     Estimated Creatinine Clearance: 59 mL/min (based on SCr of 0.69 mg/dL).  Recent Labs     12/24/24  1505 12/25/24  0509   CREATININE 0.78 0.69   BUN 19* 17   WBC 6.9 3.5*     Recent vancomycin administrations                     vancomycin (VANCOCIN) 1750 mg in 350 mL IVPB (mg) 1,750 mg New Bag 12/24/24 8749             Plan:  Current dosing regimen is therapeutic  Continue current dose of Vancomycin 1500 mg IV q24h  Repeat vancomycin concentration ordered for 12/26/2024 @ 0600   Pharmacy will continue to monitor patient and adjust therapy as indicated    Thank you for the consult,  Quinn Parks RPH  12/25/2024 2:02 PM

## 2024-12-25 NOTE — CARE COORDINATION
12/25/24 9704   Service Assessment   Patient Orientation Alert and Oriented;Person;Place;Situation;Self   Cognition Alert   History Provided By Patient   Primary Caregiver Self   Support Systems Children   Patient's Healthcare Decision Maker is: Legal Next of Kin   PCP Verified by CM Yes   Last Visit to PCP Within last year   Prior Functional Level Assistance with the following:;Bathing;Dressing;Toileting;Feeding;Cooking;Housework;Shopping;Mobility   Current Functional Level Assistance with the following:;Bathing;Dressing;Toileting;Feeding;Cooking;Housework;Shopping;Mobility   Can patient return to prior living arrangement Yes   Ability to make needs known: Good   Family able to assist with home care needs: Yes   Would you like for me to discuss the discharge plan with any other family members/significant others, and if so, who? No   Financial Resources Medicare   Community Resources None   Social/Functional History   Lives With Son   Type of Home Apartment   Home Layout One level   Home Access Level entry   Bathroom Shower/Tub Tub/Shower unit   Bathroom Toilet Standard   Bathroom Equipment None   Bathroom Accessibility Accessible   Home Equipment Wheelchair - Manual   Receives Help From Family   Prior Level of Assist for ADLs Needs assistance   Bath Moderate assistance   Dressing Moderate assistance   Grooming Modified independent    Feeding Independent   Toileting Needs assistance   Prior Level of Assist for Homemaking Needs assistance   Meal Prep Unable to assess (comment)   Laundry Unable to assess (comment)   Vacuuming Unable to assess (comment)   Cleaning Unable to assess (comment)   Gardening Unable to assess (comment)   Yard Work Unable to assess (comment)   Driving Unable to assess (comment)   Shopping Unable to assess (comment)    Unable to assess (comment)   Other (Comment) Unable to assess (comment)   Homemaking Responsibilities No   Ambulation Assistance Non-ambulatory   Prior Level of

## 2024-12-25 NOTE — PROGRESS NOTES
Hospitalist Progress Note    Patient: Ritika Clancy MRN: [id number]  CSN: [id number]    YOB: 1940  Age: 84 y.o.  Sex: female    DOA: 12/24/2024 LOS:  LOS: 1 day          Chief Complain :  Cough, shortness of breath  84 y.o. female with COPD, hypertension, aortic valve replacement on Coumadin, cardiomyopathy presents to ER with concerns of shortness of breath and cough that has been progressively getting worse for the past 10 days.   Subjective:   Patient laying in bed, feels better, on high flow oxygen.    Assessment/Plan     Active Hospital Problems    Diagnosis     Acute respiratory failure with hypoxemia [J96.01]     COPD with acute exacerbation (HCC) [J44.1]     Primary hypertension [I10]     Status post mechanical aortic valve replacement [Z95.2]     Cardiomyopathy (HCC) [I42.9]     Acute respiratory failure with hypoxia [J96.01]           CRITICAL CARE PLAN     Resp   Acute respiratory failure with hypoxia  Continue on high flow oxygen  CTA chest no PE, showed mucous plugging right middle lobe and right lower lobe with right middle lobe airspace opacity likely volume loss.  Started on Solu-Medrol, Brovana and Pulmicort.  DuoNeb as needed  Incentive spirometry.  PCCM following     ID   Follow up blood  cx.  Respiratory viral panel with positive influenza A  Urine Legionella and strep pneumo antigen  Out of window for Tamiflu  ANTIBIOTICS vancomycin, ceftriaxone, azithromycin.   Trend temps, wbc, LA improving.     CVS   Monitor HD.   Cardiomyopathy  Last echo June 2024 with EF of 45 to 50%, mildly decreased left ventricular systolic function.  Status post aortic valve replacement, mechanical valve.  On  evaluation  Counseling and educating the patient/family/caregiver  Ordering medications, tests, or procedures  Referring and communicating with other health care professionals as needed  Documenting clinical information in the electronic or other health record  Independently interpreting results (not reported separately) and communicating results to the patient/family/caregiver  Care coordination and discharge planning with Case Management.    Dear patient or family member or Caretaker, if you are reviewing this note and have a question regarding the medical terminology, please bring it with you to your next PCP visit.  Medical notes are meant to be a communication between medical professionals.  Additionally, portion of this note were created using voice recognition function, syntax and phonetic over may have escaped proofreading.

## 2024-12-26 ENCOUNTER — APPOINTMENT (OUTPATIENT)
Facility: HOSPITAL | Age: 84
End: 2024-12-26
Payer: MEDICARE

## 2024-12-26 LAB
ALBUMIN SERPL-MCNC: 2.6 G/DL (ref 3.4–5)
ALBUMIN/GLOB SERPL: 0.8 (ref 0.8–1.7)
ALP SERPL-CCNC: 47 U/L (ref 45–117)
ALT SERPL-CCNC: 34 U/L (ref 13–56)
ANION GAP SERPL CALC-SCNC: 6 MMOL/L (ref 3–18)
AST SERPL-CCNC: 43 U/L (ref 10–38)
BASOPHILS # BLD: 0 K/UL (ref 0–0.1)
BASOPHILS NFR BLD: 0 % (ref 0–2)
BILIRUB SERPL-MCNC: 0.4 MG/DL (ref 0.2–1)
BUN SERPL-MCNC: 16 MG/DL (ref 7–18)
BUN/CREAT SERPL: 29 (ref 12–20)
CALCIUM SERPL-MCNC: 8.1 MG/DL (ref 8.5–10.1)
CHLORIDE SERPL-SCNC: 109 MMOL/L (ref 100–111)
CO2 SERPL-SCNC: 28 MMOL/L (ref 21–32)
CREAT SERPL-MCNC: 0.56 MG/DL (ref 0.6–1.3)
DIFFERENTIAL METHOD BLD: ABNORMAL
EOSINOPHIL # BLD: 0.3 K/UL (ref 0–0.4)
EOSINOPHIL NFR BLD: 5 % (ref 0–5)
ERYTHROCYTE [DISTWIDTH] IN BLOOD BY AUTOMATED COUNT: 12 % (ref 11.6–14.5)
GLOBULIN SER CALC-MCNC: 3.1 G/DL (ref 2–4)
GLUCOSE SERPL-MCNC: 129 MG/DL (ref 74–99)
HCT VFR BLD AUTO: 37.9 % (ref 35–45)
HGB BLD-MCNC: 12.6 G/DL (ref 12–16)
IMM GRANULOCYTES # BLD AUTO: 0 K/UL (ref 0–0.04)
IMM GRANULOCYTES NFR BLD AUTO: 0 % (ref 0–0.5)
INR PPP: 3.3 (ref 0.9–1.1)
LYMPHOCYTES # BLD: 0.2 K/UL (ref 0.9–3.6)
LYMPHOCYTES NFR BLD: 4 % (ref 21–52)
MCH RBC QN AUTO: 32.1 PG (ref 24–34)
MCHC RBC AUTO-ENTMCNC: 33.2 G/DL (ref 31–37)
MCV RBC AUTO: 96.7 FL (ref 78–100)
MONOCYTES # BLD: 0.5 K/UL (ref 0.05–1.2)
MONOCYTES NFR BLD: 9 % (ref 3–10)
NEUTS SEG # BLD: 4 K/UL (ref 1.8–8)
NEUTS SEG NFR BLD: 82 % (ref 40–73)
NRBC # BLD: 0 K/UL (ref 0–0.01)
NRBC BLD-RTO: 0 PER 100 WBC
PLATELET # BLD AUTO: 179 K/UL (ref 135–420)
PMV BLD AUTO: 10.8 FL (ref 9.2–11.8)
POTASSIUM SERPL-SCNC: 3.6 MMOL/L (ref 3.5–5.5)
PROT SERPL-MCNC: 5.7 G/DL (ref 6.4–8.2)
PROTHROMBIN TIME: 33.7 SEC (ref 11.9–14.9)
RBC # BLD AUTO: 3.92 M/UL (ref 4.2–5.3)
RBC MORPH BLD: ABNORMAL
RBC MORPH BLD: ABNORMAL
SODIUM SERPL-SCNC: 143 MMOL/L (ref 136–145)
VANCOMYCIN SERPL-MCNC: 22.4 UG/ML (ref 5–40)
WBC # BLD AUTO: 5 K/UL (ref 4.6–13.2)

## 2024-12-26 PROCEDURE — 6370000000 HC RX 637 (ALT 250 FOR IP): Performed by: INTERNAL MEDICINE

## 2024-12-26 PROCEDURE — 80202 ASSAY OF VANCOMYCIN: CPT

## 2024-12-26 PROCEDURE — 2500000003 HC RX 250 WO HCPCS: Performed by: HOSPITALIST

## 2024-12-26 PROCEDURE — 6360000002 HC RX W HCPCS: Performed by: INTERNAL MEDICINE

## 2024-12-26 PROCEDURE — 94640 AIRWAY INHALATION TREATMENT: CPT

## 2024-12-26 PROCEDURE — 36415 COLL VENOUS BLD VENIPUNCTURE: CPT

## 2024-12-26 PROCEDURE — 2500000003 HC RX 250 WO HCPCS: Performed by: INTERNAL MEDICINE

## 2024-12-26 PROCEDURE — 2580000003 HC RX 258: Performed by: INTERNAL MEDICINE

## 2024-12-26 PROCEDURE — 51798 US URINE CAPACITY MEASURE: CPT

## 2024-12-26 PROCEDURE — 71045 X-RAY EXAM CHEST 1 VIEW: CPT

## 2024-12-26 PROCEDURE — 80053 COMPREHEN METABOLIC PANEL: CPT

## 2024-12-26 PROCEDURE — 85025 COMPLETE CBC W/AUTO DIFF WBC: CPT

## 2024-12-26 PROCEDURE — 2500000003 HC RX 250 WO HCPCS: Performed by: EMERGENCY MEDICINE

## 2024-12-26 PROCEDURE — 1100000003 HC PRIVATE W/ TELEMETRY

## 2024-12-26 PROCEDURE — 6370000000 HC RX 637 (ALT 250 FOR IP): Performed by: HOSPITALIST

## 2024-12-26 PROCEDURE — 85610 PROTHROMBIN TIME: CPT

## 2024-12-26 PROCEDURE — 2700000000 HC OXYGEN THERAPY PER DAY

## 2024-12-26 RX ORDER — IPRATROPIUM BROMIDE AND ALBUTEROL SULFATE 2.5; .5 MG/3ML; MG/3ML
1 SOLUTION RESPIRATORY (INHALATION)
Status: DISCONTINUED | OUTPATIENT
Start: 2024-12-27 | End: 2024-12-29

## 2024-12-26 RX ADMIN — BUDESONIDE 500 MCG: 0.5 INHALANT RESPIRATORY (INHALATION) at 07:19

## 2024-12-26 RX ADMIN — WATER 2000 MG: 1 INJECTION INTRAMUSCULAR; INTRAVENOUS; SUBCUTANEOUS at 14:20

## 2024-12-26 RX ADMIN — SODIUM CHLORIDE, PRESERVATIVE FREE 10 ML: 5 INJECTION INTRAVENOUS at 20:40

## 2024-12-26 RX ADMIN — WATER 40 MG: 1 INJECTION INTRAMUSCULAR; INTRAVENOUS; SUBCUTANEOUS at 07:00

## 2024-12-26 RX ADMIN — WATER 2000 MG: 1 INJECTION INTRAMUSCULAR; INTRAVENOUS; SUBCUTANEOUS at 02:19

## 2024-12-26 RX ADMIN — SODIUM CHLORIDE, PRESERVATIVE FREE 10 ML: 5 INJECTION INTRAVENOUS at 00:31

## 2024-12-26 RX ADMIN — Medication: at 20:00

## 2024-12-26 RX ADMIN — IPRATROPIUM BROMIDE AND ALBUTEROL SULFATE 1 DOSE: .5; 2.5 SOLUTION RESPIRATORY (INHALATION) at 10:57

## 2024-12-26 RX ADMIN — WATER 40 MG: 1 INJECTION INTRAMUSCULAR; INTRAVENOUS; SUBCUTANEOUS at 00:30

## 2024-12-26 RX ADMIN — IPRATROPIUM BROMIDE AND ALBUTEROL SULFATE 1 DOSE: .5; 2.5 SOLUTION RESPIRATORY (INHALATION) at 03:58

## 2024-12-26 RX ADMIN — ARFORMOTEROL TARTRATE 15 MCG: 15 SOLUTION RESPIRATORY (INHALATION) at 19:43

## 2024-12-26 RX ADMIN — ARFORMOTEROL TARTRATE 15 MCG: 15 SOLUTION RESPIRATORY (INHALATION) at 07:20

## 2024-12-26 RX ADMIN — AZITHROMYCIN MONOHYDRATE 500 MG: 500 INJECTION, POWDER, LYOPHILIZED, FOR SOLUTION INTRAVENOUS at 17:58

## 2024-12-26 RX ADMIN — IPRATROPIUM BROMIDE AND ALBUTEROL SULFATE 1 DOSE: .5; 2.5 SOLUTION RESPIRATORY (INHALATION) at 00:55

## 2024-12-26 RX ADMIN — BUDESONIDE 500 MCG: 0.5 INHALANT RESPIRATORY (INHALATION) at 19:43

## 2024-12-26 RX ADMIN — SODIUM CHLORIDE, PRESERVATIVE FREE 10 ML: 5 INJECTION INTRAVENOUS at 07:50

## 2024-12-26 RX ADMIN — ATORVASTATIN CALCIUM 40 MG: 20 TABLET, FILM COATED ORAL at 11:07

## 2024-12-26 RX ADMIN — WATER 40 MG: 1 INJECTION INTRAMUSCULAR; INTRAVENOUS; SUBCUTANEOUS at 20:39

## 2024-12-26 RX ADMIN — IPRATROPIUM BROMIDE AND ALBUTEROL SULFATE 1 DOSE: .5; 2.5 SOLUTION RESPIRATORY (INHALATION) at 19:43

## 2024-12-26 RX ADMIN — IPRATROPIUM BROMIDE AND ALBUTEROL SULFATE 1 DOSE: .5; 2.5 SOLUTION RESPIRATORY (INHALATION) at 07:20

## 2024-12-26 RX ADMIN — IPRATROPIUM BROMIDE AND ALBUTEROL SULFATE 1 DOSE: .5; 2.5 SOLUTION RESPIRATORY (INHALATION) at 15:56

## 2024-12-26 RX ADMIN — PANTOPRAZOLE SODIUM 40 MG: 40 TABLET, DELAYED RELEASE ORAL at 07:00

## 2024-12-26 RX ADMIN — SODIUM CHLORIDE, PRESERVATIVE FREE 10 ML: 5 INJECTION INTRAVENOUS at 07:51

## 2024-12-26 RX ADMIN — WATER 40 MG: 1 INJECTION INTRAMUSCULAR; INTRAVENOUS; SUBCUTANEOUS at 11:07

## 2024-12-26 ASSESSMENT — PAIN SCALES - GENERAL
PAINLEVEL_OUTOF10: 0

## 2024-12-26 NOTE — PROGRESS NOTES
Hospitalist Progress Note    Patient: Ritika Clancy MRN: [id number]  [facility]: [id number]    YOB: 1940  Age: 84 y.o.  Sex: female    DOA: 12/24/2024 LOS:  LOS: 2 days          Chief Complain :  Cough, shortness of breath  84 y.o. female with COPD, hypertension, aortic valve replacement on Coumadin, cardiomyopathy presents to ER with concerns of shortness of breath and cough that has been progressively getting worse for the past 10 days.   Subjective:   Patient laying in bed, feels better, on high flow oxygen.    Assessment/Plan     Active Hospital Problems    Diagnosis     Acute respiratory failure with hypoxemia [J96.01]     COPD with acute exacerbation (HCC) [J44.1]     Primary hypertension [I10]     Status post mechanical aortic valve replacement [Z95.2]     Cardiomyopathy (HCC) [I42.9]     Acute respiratory failure with hypoxia [J96.01]           CRITICAL CARE PLAN     Resp   Acute respiratory failure with hypoxia  Continue on high flow oxygen  CTA chest no PE, showed mucous plugging right middle lobe and right lower lobe with right middle lobe airspace opacity likely volume loss.  Started on Solu-Medrol, Brovana and Pulmicort.  DuoNeb as needed  Incentive spirometry.  PCCM following     ID   Follow up blood  cx.  Respiratory viral panel with positive influenza A  Urine Legionella and strep pneumo antigen  Out of window for Tamiflu  ANTIBIOTICS vancomycin, ceftriaxone, azithromycin.   Trend temps, wbc, LA improving.     CVS   Monitor HD.   Cardiomyopathy  Last echo June 2024 with EF of 45 to 50%, mildly decreased left ventricular systolic function.  Status post aortic valve replacement, mechanical valve.  On  tender.  +Bowel sounds,   Extremities: No c/c/e  Psych:   Not anxious or agitated.  Neurologic:  No acute neurological deficit.         Vital signs/Intake and Output:  Visit Vitals  /63   Pulse 53   Temp 98 °F (36.7 °C) (Oral)   Resp 15   Ht 1.626 m (5' 4\")   Wt 68 kg (150 lb)   SpO2 100%   BMI 25.75 kg/m²     Current Shift:  No intake/output data recorded.  Last three shifts:  12/24 1901 - 12/26 0700  In: 0   Out: 1050 [Urine:1050]            Labs: Results:       Chemistry Recent Labs     12/24/24  1505 12/25/24  0509 12/26/24  0507    141 143   K 3.0* 3.9 3.6   CL 99* 105 109   CO2 33* 32 28   BUN 19* 17 16   GLOB 3.4 3.7 3.1   ,No results found for: \"LACTA\"   CBC w/Diff Recent Labs     12/24/24  1505 12/25/24  0509   WBC 6.9 3.5*   RBC 4.02* 3.73*   HGB 13.0 12.1   HCT 39.1 36.6   * 112*      Cardiac Enzymes Lab Results   Component Value Date    TROPHS 45 12/25/2024   ,No results found for: \"BNP\"   Coagulation Recent Labs     12/24/24  1529 12/25/24  0509 12/26/24  0507   INR 1.9* 2.3* 3.3*   APTT 44.6*  --   --        Lipid Panel No results found for: \"CHOL\", \"CHOLPOCT\", \"CHLST\", \"CHOLV\", \"454447\", \"HDL\", \"HDLC\", \"LDL\", \"LDLC\", \"VLDLC\", \"VLDL\"   Pancreas No results for input(s): \"LIPASE\" in the last 72 hours.,No results for input(s): \"AMYLASE\" in the last 72 hours.   Liver Enzymes No results for input(s): \"TP\" in the last 72 hours.    Invalid input(s): \"ALB\", \"TBIL\", \"AP\", \"SGOT\", \"GPT\", \"DBIL\"   Thyroid Studies No results found for: \"T4\", \"T3RU\", \"TSH\"     Procedures/imaging: see electronic medical records for all procedures/Xrays and details which were not copied into this note but were reviewed prior to creation of Plan    TIME: E/M Time spent with patient and patient care issues: 55 mins.     This time also includes physician non-face-to-face service time visit on the date of service such as  Preparing to see the patient (eg, review of tests)  Obtaining and/or reviewing separately obtained

## 2024-12-26 NOTE — PROGRESS NOTES
Bladder Scan patient 292 shown. MD Bauman to hold off on straight cath bladder scan again at [phone]

## 2024-12-26 NOTE — PROGRESS NOTES
Warfarin Dosing - Pharmacy Consult Note  Consulting Provider: Dr. Norman Olivia    Indication:  Mechanical Heart Valve (atrial)  Warfarin Dose prior to admission: Warfarin 5 mg PO tablet daily     Concurrent anticoagulants/antiplatelets: None of note  Significant Drug Interactions: macrolides (incr, INR) / Azithromycin 500 mg IV q24h    Recent Labs     12/24/24  1505 12/24/24  1529 12/25/24  0509 12/26/24  0507   INR  --  1.9* 2.3* 3.3*   HGB 13.0  --  12.1  --    *  --  112*  --      Recent warfarin administrations                     warfarin (COUMADIN) tablet 2 mg (mg) 2 mg Given 12/25/24 1821    warfarin (COUMADIN) tablet 5 mg (mg) 5 mg Given 12/24/24 0004                  Assessment/Plan  (Goal INR: 2 - 3)  Holding dose of warfarin for tonight  INR jumped to 3.3 from 2.3  (Will reevaluate based on INR tomorrow)    Active problem list reviewed.  INR orders are placed.  Chart reviewed for pertinent labs, drug/diet interactions, and past doses.  Documentation of patient's clinical condition was reviewed.    Pharmacy Dosing:  Pharmacy will continue to follow.     Aileen Echols, PharmD  Clinical Pharmacist  [phone]

## 2024-12-26 NOTE — PROGRESS NOTES
[facility]   Pharmacy Pharmacokinetic Monitoring Service - Vancomycin    Consulting Provider: Dr. Olivia   Indication: Pneumonia (Aspriration) x 7 days  Target Concentration: Goal AUC/HILARIO 400-600 mg*hr/L  Day of Therapy: 3 or 7  Additional Antimicrobials: azithromycin, ceftriaxone    Pertinent Laboratory Values:   Wt Readings from Last 1 Encounters:   12/24/24 68 kg (150 lb)     Temp Readings from Last 1 Encounters:   12/26/24 98.1 °F (36.7 °C) (Oral)     Estimated Creatinine Clearance: 72 mL/min (A) (based on SCr of 0.56 mg/dL (L)).  Recent Labs     12/24/24  1505 12/25/24  0509 12/26/24  0507   CREATININE 0.78 0.69 0.56*   BUN 19* 17 16   WBC 6.9 3.5*  --          Pertinent Cultures:  Culture Date Source Results   12/24 Nasopharyngeal Influenza A (+)   MRSA Nasal Swab: was negative on 12/24, ordered for respiratory indication, pharmacy to contact provider about discontinuing vancomycin    Recent vancomycin administrations                     vancomycin (VANCOCIN) 1,500 mg in sodium chloride 0.9 % 500 mL IVPB ()  New Bag 12/25/24 1904    vancomycin (VANCOCIN) 1750 mg in 350 mL IVPB (mg) 1,750 mg New Bag 12/24/24 1839                    Assessment:  Date/Time Current Dose Concentration Timing of Concentration (h) AUC   12/26 Vanc 1.5 gm q24h 22.4 mg/L 10h 3m 557 mg/L.hr   Note: Serum concentrations collected for AUC dosing may appear elevated if collected in close proximity to the dose administered, this is not necessarily an indication of toxicity    Plan:  Current dosing regimen is therapeutic  Continue current dose: Vancomycin 1,500 mg IV every 24 hours  Estimated AUC/Tr= 557 mg/L.hr and 14.8 mg/L at Steady State  Repeat vancomycin concentration not ordered at this time   Pharmacy will continue to monitor patient and adjust therapy as indicated    Thank you for the consult,  ELROY TORRES RPH  12/26/2024 7:27 AM

## 2024-12-26 NOTE — PLAN OF CARE
Problem: Discharge Planning  Goal: Discharge to home or other facility with appropriate resources  12/26/2024 0812 by Neela Castellon RN  Outcome: Progressing  12/26/2024 0812 by Neela Castellon RN  Outcome: Progressing  Flowsheets (Taken 12/26/2024 0450)  Discharge to home or other facility with appropriate resources:   Identify barriers to discharge with patient and caregiver   Arrange for needed discharge resources and transportation as appropriate   Identify discharge learning needs (meds, wound care, etc)   Arrange for interpreters to assist at discharge as needed  12/26/2024 0310 by Paula Pickering RN  Outcome: Progressing  Flowsheets (Taken 12/25/2024 2000)  Discharge to home or other facility with appropriate resources:   Identify barriers to discharge with patient and caregiver   Arrange for needed discharge resources and transportation as appropriate   Identify discharge learning needs (meds, wound care, etc)   Arrange for interpreters to assist at discharge as needed   Refer to discharge planning if patient needs post-hospital services based on physician order or complex needs related to functional status, cognitive ability or social support system     Problem: Safety - Adult  Goal: Free from fall injury  12/26/2024 0812 by Neela Castellon RN  Outcome: Progressing  12/26/2024 0812 by Neela Castellon RN  Outcome: Progressing  12/26/2024 0310 by Paula Pickering RN  Outcome: Progressing     Problem: Chronic Conditions and Co-morbidities  Goal: Patient's chronic conditions and co-morbidity symptoms are monitored and maintained or improved  12/26/2024 0812 by Neela Castellon RN  Outcome: Progressing  12/26/2024 0812 by Neela Castellon RN  Outcome: Progressing  Flowsheets (Taken 12/26/2024 0450)  Care Plan - Patient's Chronic Conditions and Co-Morbidity Symptoms are Monitored and Maintained or Improved:   Monitor and assess patient's chronic conditions and comorbid symptoms for stability,

## 2024-12-26 NOTE — PROGRESS NOTES
[facility]  Pulmonary, Critical Care, and Sleep Medicine    Name: Ritika Clancy MRN: [id number]   : 1940 Hospital: [facility]    Date: 2024  Room: [address] [place] [address] Note                                                                             Consult requesting physician: Darron Seth PA-C   Reason for Consult: COPD exacerbation    IMPRESSION:     Acute respiratory failure with hypoxemia  COPD exacerbation  HTN  Cardiomyopathy  Poor mobility  S/p mechanical aortic valve replacement      Active Hospital Problems    Diagnosis Date Noted    Acute respiratory failure with hypoxemia [J96.01] 2024    COPD with acute exacerbation (HCC) [J44.1] 2024    Primary hypertension [I10] 2024    Status post mechanical aortic valve replacement [Z95.2] 2024    Cardiomyopathy (HCC) [I42.9] 2024    Acute respiratory failure with hypoxia [J96.01] 2024         Code status: Full Code       RECOMMENDATIONS:     Patient with known emphysema/COPD, presenting with COPD exacerbation.  Influenza A-positive    Respiratory: Patient respirations and oxygenation improved; patient down to nasal cannula oxygen-3 L  CTA chest reviewed images and report-bilateral upper lobe centrilobular emphysematous changes; no focal consolidation; bilateral basal bronchial obstruction with mucus, and similar findings in right middle lobe bronchial airways as well; right middle lobe atelectasis noted in the lateral segments; no pleural effusions; no central or segmental PEs; enlarged central pulmonary artery suggestive of pulmonary hypertension.  Chest x-ray done today reviewed images and report and compared to prior-sternotomy wires, prosthetic/mechanical aortic valve, mild bibasal infiltrates, no focal consolidations, no pleural effusions  Bronchodilators-Brovana and budesonide nebs twice a day.  Ipratropium/albuterol nebs 4-5 times daily.  IV steroids-Solu-Medrol  abnormalities. Median sternotomy wires are visualized.   No acute cardiopulmonary abnormalities. Electronically signed by ADENIKE Jorge       ECHO 6/3/2024  [facility] Echo: Complete with Contrast Protocol  Narrative    Left Ventricle: Left ventricle size is normal. Normal wall thickness .  There is abnormal septal motion. The ejection fraction by visual  approximation is 45 - 50%. There is mildly reduced systolic dysfunction.  Unable to assess diastology due to poor image quality.    Right Ventricle: Right ventricle size is normal. Normal systolic  function.    Left Atrium: Left atrium size is normal.    Aortic Valve: Mechanical valve. No stenosis. Normal prosthetic  gradient. AV mean gradient is 6.00 mmHg. AV peak gradient is 12.53 mmHg.  No transvalvular regurgitation. No paravalvular regurgitation.    Tricuspid Valve: Mild to moderate (1-2+) transvalvular regurgitation  with a centrally directed jet. Mild pulmonary artery hypertension. RVSP is  36.0 mmHg.          Please note: Voice-recognition software may have been used to generate this report, which may have resulted in some phonetic-based errors in grammar and contents. Even though attempts were made to correct all the mistakes, some may have been missed, and remained in the body of the document.      Norman Olivia MD  12/26/2024

## 2024-12-26 NOTE — PLAN OF CARE
Problem: Discharge Planning  Goal: Discharge to home or other facility with appropriate resources  12/26/2024 0812 by Neela Castellon RN  Outcome: Progressing  Flowsheets (Taken 12/26/2024 0450)  Discharge to home or other facility with appropriate resources:   Identify barriers to discharge with patient and caregiver   Arrange for needed discharge resources and transportation as appropriate   Identify discharge learning needs (meds, wound care, etc)   Arrange for interpreters to assist at discharge as needed  12/26/2024 0310 by Paula Pickering RN  Outcome: Progressing  Flowsheets (Taken 12/25/2024 2000)  Discharge to home or other facility with appropriate resources:   Identify barriers to discharge with patient and caregiver   Arrange for needed discharge resources and transportation as appropriate   Identify discharge learning needs (meds, wound care, etc)   Arrange for interpreters to assist at discharge as needed   Refer to discharge planning if patient needs post-hospital services based on physician order or complex needs related to functional status, cognitive ability or social support system     Problem: Safety - Adult  Goal: Free from fall injury  12/26/2024 0812 by Neela Castellon RN  Outcome: Progressing  12/26/2024 0310 by Paula Pickering RN  Outcome: Progressing     Problem: Chronic Conditions and Co-morbidities  Goal: Patient's chronic conditions and co-morbidity symptoms are monitored and maintained or improved  12/26/2024 0812 by Neela Castellon RN  Outcome: Progressing  Flowsheets (Taken 12/26/2024 0450)  Care Plan - Patient's Chronic Conditions and Co-Morbidity Symptoms are Monitored and Maintained or Improved:   Monitor and assess patient's chronic conditions and comorbid symptoms for stability, deterioration, or improvement   Collaborate with multidisciplinary team to address chronic and comorbid conditions and prevent exacerbation or deterioration   Update acute care plan with  appropriate goals if chronic or comorbid symptoms are exacerbated and prevent overall improvement and discharge  12/26/2024 0310 by Paula Pickering, RN  Outcome: Progressing  Flowsheets (Taken 12/25/2024 2000)  Care Plan - Patient's Chronic Conditions and Co-Morbidity Symptoms are Monitored and Maintained or Improved:   Monitor and assess patient's chronic conditions and comorbid symptoms for stability, deterioration, or improvement   Collaborate with multidisciplinary team to address chronic and comorbid conditions and prevent exacerbation or deterioration   Update acute care plan with appropriate goals if chronic or comorbid symptoms are exacerbated and prevent overall improvement and discharge     Problem: Skin/Tissue Integrity  Goal: Absence of new skin breakdown  Description: 1.  Monitor for areas of redness and/or skin breakdown  2.  Assess vascular access sites hourly  3.  Every 4-6 hours minimum:  Change oxygen saturation probe site  4.  Every 4-6 hours:  If on nasal continuous positive airway pressure, respiratory therapy assess nares and determine need for appliance change or resting period.  12/26/2024 0812 by Neela Castellon, RN  Outcome: Progressing  12/26/2024 0310 by Paula Pickering RN  Outcome: Progressing

## 2024-12-27 LAB
ALBUMIN SERPL-MCNC: 2.6 G/DL (ref 3.4–5)
ALBUMIN/GLOB SERPL: 0.7 (ref 0.8–1.7)
ALP SERPL-CCNC: 50 U/L (ref 45–117)
ALT SERPL-CCNC: 48 U/L (ref 13–56)
ANION GAP SERPL CALC-SCNC: 2 MMOL/L (ref 3–18)
AST SERPL-CCNC: 50 U/L (ref 10–38)
BASOPHILS # BLD: 0 K/UL (ref 0–0.1)
BASOPHILS NFR BLD: 0 % (ref 0–2)
BILIRUB SERPL-MCNC: 0.5 MG/DL (ref 0.2–1)
BUN SERPL-MCNC: 17 MG/DL (ref 7–18)
BUN/CREAT SERPL: 26 (ref 12–20)
CALCIUM SERPL-MCNC: 8.3 MG/DL (ref 8.5–10.1)
CHLORIDE SERPL-SCNC: 110 MMOL/L (ref 100–111)
CO2 SERPL-SCNC: 30 MMOL/L (ref 21–32)
CREAT SERPL-MCNC: 0.65 MG/DL (ref 0.6–1.3)
DIFFERENTIAL METHOD BLD: ABNORMAL
EOSINOPHIL # BLD: 0 K/UL (ref 0–0.4)
EOSINOPHIL NFR BLD: 0 % (ref 0–5)
ERYTHROCYTE [DISTWIDTH] IN BLOOD BY AUTOMATED COUNT: 12.1 % (ref 11.6–14.5)
GLOBULIN SER CALC-MCNC: 3.6 G/DL (ref 2–4)
GLUCOSE SERPL-MCNC: 142 MG/DL (ref 74–99)
HCT VFR BLD AUTO: 36.1 % (ref 35–45)
HGB BLD-MCNC: 12 G/DL (ref 12–16)
IMM GRANULOCYTES # BLD AUTO: 0 K/UL (ref 0–0.04)
IMM GRANULOCYTES NFR BLD AUTO: 1 % (ref 0–0.5)
INR PPP: 4.1 (ref 0.9–1.1)
LYMPHOCYTES # BLD: 0.5 K/UL (ref 0.9–3.6)
LYMPHOCYTES NFR BLD: 13 % (ref 21–52)
MCH RBC QN AUTO: 32.5 PG (ref 24–34)
MCHC RBC AUTO-ENTMCNC: 33.2 G/DL (ref 31–37)
MCV RBC AUTO: 97.8 FL (ref 78–100)
MONOCYTES # BLD: 0.4 K/UL (ref 0.05–1.2)
MONOCYTES NFR BLD: 10 % (ref 3–10)
NEUTS SEG # BLD: 2.9 K/UL (ref 1.8–8)
NEUTS SEG NFR BLD: 76 % (ref 40–73)
NRBC # BLD: 0 K/UL (ref 0–0.01)
NRBC BLD-RTO: 0 PER 100 WBC
PLATELET # BLD AUTO: 158 K/UL (ref 135–420)
PMV BLD AUTO: 10.9 FL (ref 9.2–11.8)
POTASSIUM SERPL-SCNC: 3.5 MMOL/L (ref 3.5–5.5)
PROT SERPL-MCNC: 6.2 G/DL (ref 6.4–8.2)
PROTHROMBIN TIME: 39.7 SEC (ref 11.9–14.9)
RBC # BLD AUTO: 3.69 M/UL (ref 4.2–5.3)
SODIUM SERPL-SCNC: 142 MMOL/L (ref 136–145)
WBC # BLD AUTO: 3.8 K/UL (ref 4.6–13.2)

## 2024-12-27 PROCEDURE — 6360000002 HC RX W HCPCS: Performed by: INTERNAL MEDICINE

## 2024-12-27 PROCEDURE — 1100000003 HC PRIVATE W/ TELEMETRY

## 2024-12-27 PROCEDURE — 80053 COMPREHEN METABOLIC PANEL: CPT

## 2024-12-27 PROCEDURE — 2500000003 HC RX 250 WO HCPCS: Performed by: HOSPITALIST

## 2024-12-27 PROCEDURE — 6370000000 HC RX 637 (ALT 250 FOR IP): Performed by: INTERNAL MEDICINE

## 2024-12-27 PROCEDURE — 6370000000 HC RX 637 (ALT 250 FOR IP): Performed by: HOSPITALIST

## 2024-12-27 PROCEDURE — 2580000003 HC RX 258: Performed by: INTERNAL MEDICINE

## 2024-12-27 PROCEDURE — 36415 COLL VENOUS BLD VENIPUNCTURE: CPT

## 2024-12-27 PROCEDURE — 2500000003 HC RX 250 WO HCPCS: Performed by: INTERNAL MEDICINE

## 2024-12-27 PROCEDURE — 94667 MNPJ CHEST WALL 1ST: CPT

## 2024-12-27 PROCEDURE — 2500000003 HC RX 250 WO HCPCS: Performed by: EMERGENCY MEDICINE

## 2024-12-27 PROCEDURE — 2700000000 HC OXYGEN THERAPY PER DAY

## 2024-12-27 PROCEDURE — 94669 MECHANICAL CHEST WALL OSCILL: CPT

## 2024-12-27 PROCEDURE — 85610 PROTHROMBIN TIME: CPT

## 2024-12-27 PROCEDURE — 97162 PT EVAL MOD COMPLEX 30 MIN: CPT

## 2024-12-27 PROCEDURE — 85025 COMPLETE CBC W/AUTO DIFF WBC: CPT

## 2024-12-27 PROCEDURE — 94640 AIRWAY INHALATION TREATMENT: CPT

## 2024-12-27 PROCEDURE — 97530 THERAPEUTIC ACTIVITIES: CPT

## 2024-12-27 PROCEDURE — 97166 OT EVAL MOD COMPLEX 45 MIN: CPT

## 2024-12-27 RX ORDER — VANCOMYCIN 1.75 G/350ML
1250 INJECTION, SOLUTION INTRAVENOUS EVERY 24 HOURS
Status: DISCONTINUED | OUTPATIENT
Start: 2024-12-27 | End: 2024-12-27

## 2024-12-27 RX ORDER — PREDNISONE 20 MG/1
40 TABLET ORAL DAILY
Status: DISCONTINUED | OUTPATIENT
Start: 2024-12-28 | End: 2024-12-30

## 2024-12-27 RX ADMIN — IPRATROPIUM BROMIDE AND ALBUTEROL SULFATE 1 DOSE: .5; 2.5 SOLUTION RESPIRATORY (INHALATION) at 16:35

## 2024-12-27 RX ADMIN — IPRATROPIUM BROMIDE AND ALBUTEROL SULFATE 1 DOSE: .5; 2.5 SOLUTION RESPIRATORY (INHALATION) at 19:40

## 2024-12-27 RX ADMIN — SODIUM CHLORIDE, PRESERVATIVE FREE 10 ML: 5 INJECTION INTRAVENOUS at 09:24

## 2024-12-27 RX ADMIN — SODIUM CHLORIDE, PRESERVATIVE FREE 10 ML: 5 INJECTION INTRAVENOUS at 20:55

## 2024-12-27 RX ADMIN — BUDESONIDE 500 MCG: 0.5 INHALANT RESPIRATORY (INHALATION) at 19:39

## 2024-12-27 RX ADMIN — AZITHROMYCIN MONOHYDRATE 500 MG: 500 INJECTION, POWDER, LYOPHILIZED, FOR SOLUTION INTRAVENOUS at 18:14

## 2024-12-27 RX ADMIN — IPRATROPIUM BROMIDE AND ALBUTEROL SULFATE 1 DOSE: .5; 2.5 SOLUTION RESPIRATORY (INHALATION) at 08:16

## 2024-12-27 RX ADMIN — WATER 40 MG: 1 INJECTION INTRAMUSCULAR; INTRAVENOUS; SUBCUTANEOUS at 03:48

## 2024-12-27 RX ADMIN — SODIUM CHLORIDE, PRESERVATIVE FREE 5 ML: 5 INJECTION INTRAVENOUS at 09:26

## 2024-12-27 RX ADMIN — ARFORMOTEROL TARTRATE 15 MCG: 15 SOLUTION RESPIRATORY (INHALATION) at 08:16

## 2024-12-27 RX ADMIN — WATER 2000 MG: 1 INJECTION INTRAMUSCULAR; INTRAVENOUS; SUBCUTANEOUS at 02:01

## 2024-12-27 RX ADMIN — ARFORMOTEROL TARTRATE 15 MCG: 15 SOLUTION RESPIRATORY (INHALATION) at 19:40

## 2024-12-27 RX ADMIN — ATORVASTATIN CALCIUM 40 MG: 20 TABLET, FILM COATED ORAL at 09:24

## 2024-12-27 RX ADMIN — PANTOPRAZOLE SODIUM 40 MG: 40 TABLET, DELAYED RELEASE ORAL at 09:24

## 2024-12-27 RX ADMIN — BUDESONIDE 500 MCG: 0.5 INHALANT RESPIRATORY (INHALATION) at 08:15

## 2024-12-27 RX ADMIN — WATER 40 MG: 1 INJECTION INTRAMUSCULAR; INTRAVENOUS; SUBCUTANEOUS at 20:54

## 2024-12-27 RX ADMIN — WATER 40 MG: 1 INJECTION INTRAMUSCULAR; INTRAVENOUS; SUBCUTANEOUS at 13:22

## 2024-12-27 RX ADMIN — WATER 2000 MG: 1 INJECTION INTRAMUSCULAR; INTRAVENOUS; SUBCUTANEOUS at 15:56

## 2024-12-27 NOTE — PLAN OF CARE
Problem: Discharge Planning  Goal: Discharge to home or other facility with appropriate resources  Outcome: Progressing  Flowsheets  Taken 12/26/2024 2001 by Chrissy Rivera RN  Discharge to home or other facility with appropriate resources:   Identify barriers to discharge with patient and caregiver   Arrange for needed discharge resources and transportation as appropriate   Identify discharge learning needs (meds, wound care, etc)   Refer to discharge planning if patient needs post-hospital services based on physician order or complex needs related to functional status, cognitive ability or social support system  Taken 12/26/2024 1715 by Alfreda Mills RN  Discharge to home or other facility with appropriate resources: Identify barriers to discharge with patient and caregiver     Problem: Safety - Adult  Goal: Free from fall injury  Outcome: Progressing  Flowsheets (Taken 12/26/2024 2256)  Free From Fall Injury:   Instruct family/caregiver on patient safety   Based on caregiver fall risk screen, instruct family/caregiver to ask for assistance with transferring infant if caregiver noted to have fall risk factors     Problem: Chronic Conditions and Co-morbidities  Goal: Patient's chronic conditions and co-morbidity symptoms are monitored and maintained or improved  Outcome: Progressing  Flowsheets  Taken 12/26/2024 2256 by Chrissy Rivera RN  Care Plan - Patient's Chronic Conditions and Co-Morbidity Symptoms are Monitored and Maintained or Improved:   Monitor and assess patient's chronic conditions and comorbid symptoms for stability, deterioration, or improvement   Collaborate with multidisciplinary team to address chronic and comorbid conditions and prevent exacerbation or deterioration   Update acute care plan with appropriate goals if chronic or comorbid symptoms are exacerbated and prevent overall improvement and discharge  Taken 12/26/2024 1715 by Aflreda Mills RN  Care Plan - Patient's Chronic Conditions and  Co-Morbidity Symptoms are Monitored and Maintained or Improved: Monitor and assess patient's chronic conditions and comorbid symptoms for stability, deterioration, or improvement     Problem: Skin/Tissue Integrity  Goal: Absence of new skin breakdown  Outcome: Progressing     Problem: ABCDS Injury Assessment  Goal: Absence of physical injury  Outcome: Progressing

## 2024-12-27 NOTE — PROGRESS NOTES
Moderate Risk Nutrition Assessment Initial    Type and Reason for Visit: Initial, Positive nutrition screen MST 2    Nutrition Recommendations/Plan:   Cont diet and Glucerna Shakes daily as alistair provides 220kcal, 10g pro per carton   Cont to monitor lytes and replete as needed  Consider adding MVI w/min daily   Consider checking vit D25 hydroxy level and suppl if low  Cont to monitor wt trends, lytes, UOP, bowel fx, skin integrity, and adjust recs as needed     Malnutrition Assessment:  Malnutrition Status: At risk for malnutrition    Nutrition Assessment:  83yo F transferred to floor from ICU, with acute resp failure with hypoxemia, COPD exacerbation, HTN, cardiomyopathy, poor mobility, s/p mechanical aortic valve replacement with known emphysema/COPD, presenting with COPD exacerbation and +Influenza A-positive per MD. alert on O2NC noted. on cardiac diet with poor to fair PO intakes so far. Glucerna Shakes ordered. reported sig wt loss PTA however wt hx does not support this as wt is up from wt hx 54kg (July '24) if stated admit wt correct. good UOP. Reported wt loss however no reported poor PO PTA.  Poss with fluid wt fluctuations PTA.    Estimated Daily Nutrient Needs:  Energy (kcal):  1700kcal/day Weight Used for Energy Requirements: Current     Protein (g):  70g pro/day Weight Used for Protein Requirements: Current        Fluid (ml/day):  1700ml fluid/day Method Used for Fluid Requirements: 1 ml/kcal    Nutrition Related Findings:   , Ca 8.3; lipitor, solumedrol, protonix, prednisone, Wound Type: None    Current Nutrition Therapies:    ADULT DIET; Regular; Low Fat/Low Chol/High Fiber/KATIE  ADULT ORAL NUTRITION SUPPLEMENT; Breakfast; Diabetic Oral Supplement    Anthropometric Measures:  Height: 162.6 cm (5' 4.02\")  Current Body Wt: 68 kg (149 lb 14.6 oz)   BMI: 25.7 overweight     Nutrition Diagnosis:   Increased nutrient needs, Altered nutrition-related lab values related to impaired respiratory function,  increase demand for energy/nutrients (adv age with COPD and flu) as evidenced by intake 26-50%, lab values, variable po intake    Nutrition Interventions:   Food and/or Nutrient Delivery: Continue Current Diet, Continue Oral Nutrition Supplement, Vitamin Supplement  Nutrition Education/Counseling: Education/Counseling not indicated    Goals:  Goals: Meet at least 75% of estimated needs  Type of Goal: New goal  Previous Goal Met: New Goal    Nutrition Monitoring and Evaluation:   Behavioral-Environmental Outcomes: None Identified  Food/Nutrient Intake Outcomes: Food and Nutrient Intake, Supplement Intake, Vitamin/Mineral Intake  Physical Signs/Symptoms Outcomes: Biochemical Data    Discharge Planning:     (TBD)     Nella Camara MS, RD  Clinical Dietitian  E: [email or web address]  O:  [phone]  C:  [phone]

## 2024-12-27 NOTE — CARE COORDINATION
CM spoke to patient in room. Patient reports that her son provides all care for her and she lives on the first floor with no stairs. Patient was no on oxygen prior to admission. Discharge plan is home with CG support and MD follow-up. Patient is agreeable with plan and reports that she needs 3 days advance notice for MD appointments so she can schedule transportation.

## 2024-12-27 NOTE — PROGRESS NOTES
[facility]   Pharmacy Pharmacokinetic Monitoring Service - Vancomycin    Consulting Provider: Dr. Olivia   Indication: Pneumonia ([place]) x 7 days  Target Concentration: Goal AUC/HILARIO 400-600 mg*hr/L  Day of Therapy: 4 or 7  Additional Antimicrobials: azithromycin, ceftriaxone    Pertinent Laboratory Values:   Wt Readings from Last 1 Encounters:   12/24/24 68 kg (150 lb)     Temp Readings from Last 1 Encounters:   12/26/24 98.1 °F (36.7 °C) (Oral)     Estimated Creatinine Clearance: 72 mL/min (A) (based on SCr of 0.56 mg/dL (L)).  Recent Labs     12/24/24  1505 12/25/24  0509 12/26/24  0507   CREATININE 0.78 0.69 0.56*   BUN 19* 17 16   WBC 6.9 3.5*  --          Pertinent Cultures:  Culture Date Source Results   12/24 Nasopharyngeal Influenza A (+)   MRSA Nasal Swab: was negative on 12/24, ordered for respiratory indication, pharmacy to contact provider about discontinuing vancomycin    Recent vancomycin administrations                     vancomycin (VANCOCIN) 1,500 mg in sodium chloride 0.9 % 500 mL IVPB ()  New Bag 12/25/24 1904    vancomycin (VANCOCIN) 1750 mg in 350 mL IVPB (mg) 1,750 mg New Bag 12/24/24 1839                    Assessment:  Date/Time Current Dose Concentration Timing of Concentration (h) AUC   12/26 Vanc 1.5 gm q24h 22.4 mg/L 10h 3m 557 mg/L.hr   Note: Serum concentrations collected for AUC dosing may appear elevated if collected in close proximity to the dose administered, this is not necessarily an indication of toxicity    Plan:  Current dosing regimen is supra-therapeutic  \"Decrease dose to   : Vancomycin 1,250 mg IV every 24 hours  Estimated AUC/Tr= 527 mg/L.hr and 14.7 mg/L at Steady State  Repeat vancomycin concentration ordered for 12/28 at 0600   Pharmacy will continue to monitor patient and adjust therapy as indicated    Thank you for the consult,  ELROY TORRES RPH, PharmD   12/26/2024 7:27 AM

## 2024-12-27 NOTE — PROGRESS NOTES
OCCUPATIONAL THERAPY EVALUATION/DISCHARGE    Patient: Ritika Clancy (84 y.o. female)  Date of initial service: 12/27/2024  Date of final service: 12/27/2024  Number of sessions completed: 1  Primary Diagnosis: Acute respiratory failure with hypoxia [J96.01]       Precautions: Fall Risk,  ,  ,  ,  ,  ,  ,               PLOF: Patient reports she completes grooming at bed level with setup A, for toileting she uses briefs and chucks in the bed and then her son changes them- she does not transfer to potty chair because she does not know when she will need to use the bathroom, her son helps her put on clothes and she uses a button hook if needed. Patient reports she can feed herself. Patient sons help her transfer to w/c via sliding board as needed, patient reports spending most of her time is spent sitting up in bed watching tv. Patient son does a bed bath with patient.     ASSESSMENT AND RECOMMENDATIONS:  Patient presented supine in bed with O2 via NC. Patient with no visitors present for entire session. Patient having finished eating her desired amount from lunch tray. Patient completed assessment of ADLs and BUE strength, ROM (see details below). Patient completed grooming at bed level with setup A. Patient demonstrated RLE movement and rolling. Patient appears to be functioning at baseline level. Based on patient report, her son has a high caregiver burden and may benefit from respite caregiver services or a bathing aide. No Skilled OT: At baseline function    Maximum therapeutic gains met at current level of care and patient will be discharged from occupational therapy at this time.    Further Equipment Recommendations for Discharge:  ,        [facility]: AM-PAC Inpatient Daily Activity Raw Score: 13/24    Current research shows that an AM-PAC score of 17 or less is not associated with a discharge to the patient's home setting.  Based on an AM-PAC score and their current ADL deficits; it is recommended that the  patient have 3-5 sessions per week of Occupational Therapy at d/c to increase the patient's independence.       This [facility] score should be considered in conjunction with interdisciplinary team recommendations to determine the most appropriate discharge setting. Patient's social support, diagnosis, medical stability, and prior level of function should also be taken into consideration.     SUBJECTIVE:   Patient stated “I'm fine”    OBJECTIVE DATA SUMMARY:     Past Medical History:   Diagnosis Date    Asthma     Cardiomyopathy (HCC)     COPD (chronic obstructive pulmonary disease) ([facility])     H/O aortic valve replacement    History reviewed. No pertinent surgical history.  Barriers to Learning/Limitations:   Compensate with: visual, verbal, tactile, kinesthetic cues/model    Home Situation:   Social/Functional History  Lives With: Son  Type of Home: Apartment  Home Layout: One level  Home Access: Level entry  Bathroom Shower/Tub: Tub/Shower unit  Bathroom Toilet: Standard  Bathroom Equipment: None  Bathroom Accessibility: Accessible  Home Equipment: Wheelchair - Manual, Sliding Board, Adjustable bed (sliding board)  Receives Help From: Family  Prior Level of Assist for ADLs: Needs assistance  Bath: Moderate assistance  Dressing: Moderate assistance  Grooming: Modified independent   Feeding: Independent  Toileting: Needs assistance  Prior Level of Assist for Homemaking: Needs assistance  Meal Prep: Unable to assess (comment)  Laundry: Unable to assess (comment)  Vacuuming: Unable to assess (comment)  Cleaning: Unable to assess (comment)  Gardening: Unable to assess (comment)  Yard Work: Unable to assess (comment)  Driving: Unable to assess (comment)  Shopping: Unable to assess (comment)  : Unable to assess (comment)  Other (Comment): Unable to assess (comment)  Homemaking Responsibilities: No  Prior Level of Assist for Transfers: Independent  Active : No  Patient's  Info: son  Occupation: Retired  []

## 2024-12-27 NOTE — PROGRESS NOTES
[facility]ists  Pulmonary, Critical Care, and Sleep Medicine    Name: Ritika Clancy MRN: [id number]   : 1940 Hospital: [facility]    Date: 2024  Room: [address] [place] [address] Note                                                                             Consult requesting physician: Darron Seth PA-C   Reason for Consult: COPD exacerbation    IMPRESSION:     Acute respiratory failure with hypoxemia  COPD exacerbation  HTN  Cardiomyopathy  Poor mobility  S/p mechanical aortic valve replacement      Active Hospital Problems    Diagnosis Date Noted    Acute respiratory failure with hypoxemia [J96.01] 2024    COPD with acute exacerbation (HCC) [J44.1] 2024    Primary hypertension [I10] 2024    Status post mechanical aortic valve replacement [Z95.2] 2024    Cardiomyopathy (HCC) [I42.9] 2024    Acute respiratory failure with hypoxia [J96.01] 2024         Code status: Full Code       RECOMMENDATIONS:     Patient with known emphysema/COPD, presenting with COPD exacerbation.  Influenza A-positive    Respiratory: Patient respirations and oxygenation improved; patient down to nasal cannula oxygen-3 L  CTA chest reviewed images and report-bilateral upper lobe centrilobular emphysematous changes; no focal consolidation; bilateral basal bronchial obstruction with mucus, and similar findings in right middle lobe bronchial airways as well; right middle lobe atelectasis noted in the lateral segments; no pleural effusions; no central or segmental PEs; enlarged central pulmonary artery suggestive of pulmonary hypertension.  Chest x-ray done  reviewed images and report and compared to prior-sternotomy wires, prosthetic/mechanical aortic valve, mild bibasal infiltrates, no focal consolidations, no pleural effusions  Bronchodilators-Brovana and budesonide nebs twice a day.  Ipratropium/albuterol nebs 4-5 times daily.  IV steroids-Solu-Medrol  prosthetic  gradient. AV mean gradient is 6.00 mmHg. AV peak gradient is 12.53 mmHg.  No transvalvular regurgitation. No paravalvular regurgitation.    Tricuspid Valve: Mild to moderate (1-2+) transvalvular regurgitation  with a centrally directed jet. Mild pulmonary artery hypertension. RVSP is  36.0 mmHg.          Please note: Voice-recognition software may have been used to generate this report, which may have resulted in some phonetic-based errors in grammar and contents. Even though attempts were made to correct all the mistakes, some may have been missed, and remained in the body of the document.      Norman Olivia MD  12/27/2024

## 2024-12-27 NOTE — PROGRESS NOTES
PHYSICAL THERAPY EVALUATION/DISCHARGE    Patient: Ritika Clancy (84 y.o. female)  Date: 12/27/2024  Primary Diagnosis: Acute respiratory failure with hypoxia [J96.01]  Precautions: Fall Risk  PLOF: Assisted mobility, transfers and total care for ADLs. She reports he son assist her with sliding board transfer to/from wheelchair.    ASSESSMENT AND RECOMMENDATIONS:  Patient presents with decreased strength and endurance, decreased L LE ROM, knee flexion with overpressure only available to ~20 degrees flexion. Patient performed rolling R/L with CGA. Patient reports her son assists her to/from wheelchair via sliding board transfer. Patient declined attempts at EOB or sliding board transfer due to stating she has pain with unsupported sitting position. Patient feels she is at baseline level with regards to functional mobility. Patient unsure if she would like home health PT and she declined rehab placement at d/c. If agreeable pt may benefit from home health PT evaluation of home safety. Patient does not require further skilled physical therapy intervention at this level of care.    Further Equipment Recommendations for Discharge:  No (pt states she has all needs met at home)    [facility]:   AM-PAC Inpatient Mobility without Stair Climbing Raw Score : 10    This AMPAC score should be considered in conjunction with interdisciplinary team recommendations to determine the most appropriate discharge setting. Patient's social support, diagnosis, medical stability, and prior level of function should also be taken into consideration.    Current research shows that an AM-PAC score of 17 (13 without stairs) or less is not associated with a discharge to the patient's home setting. Based on an AM-PAC score and their current functional mobility deficits, it is recommended that the patient have 5-7 sessions per week of Physical Therapy at d/c to increase the patient's independence.  Currently, this patient demonstrates the potential

## 2024-12-27 NOTE — PROGRESS NOTES
Hospitalist Progress Note    Patient: Ritika Clancy MRN: [id number]  CSN: [id number]    YOB: 1940  Age: 84 y.o.  Sex: female    DOA: 12/24/2024 LOS:  LOS: 3 days          Chief Complain :  Cough, shortness of breath  84 y.o. female with COPD, hypertension, aortic valve replacement on Coumadin, cardiomyopathy presents to ER with concerns of shortness of breath and cough that has been progressively getting worse for the past 10 days.   Subjective:   Patient laying in bed, feels better, on oxygen by nasal cannula.    Assessment/Plan     Active Hospital Problems    Diagnosis     Acute respiratory failure with hypoxemia [J96.01]     COPD with acute exacerbation (HCC) [J44.1]     Primary hypertension [I10]     Status post mechanical aortic valve replacement [Z95.2]     Cardiomyopathy (HCC) [I42.9]     Acute respiratory failure with hypoxia [J96.01]           CRITICAL CARE PLAN     Resp   Acute respiratory failure with hypoxia  On oxygen by nasal cannula, wean as tolerated  CTA chest no PE, showed mucous plugging right middle lobe and right lower lobe with right middle lobe airspace opacity likely volume loss.  Started on Solu-Medrol, Brovana and Pulmicort.  DuoNeb as needed  Incentive spirometry.  PCCM following     ID   Follow up blood  cx.  Respiratory viral panel with positive influenza A  Urine Legionella and strep pneumo antigen  Out of window for Tamiflu  ANTIBIOTICS vancomycin, ceftriaxone, azithromycin.   Trend temps, wbc, LA improving.     CVS   Monitor HD.   Cardiomyopathy  Last echo June 2024 with EF of 45 to 50%, mildly decreased left ventricular systolic function.  Status post aortic valve replacement,  +Bowel sounds,   Extremities: No c/c/e  Psych:   Not anxious or agitated.  Neurologic:  No acute neurological deficit.         Vital signs/Intake and Output:  Visit Vitals  /75   Pulse 63   Temp 97.9 °F (36.6 °C) (Oral)   Resp 16   Ht 1.626 m (5' 4\")   Wt 68 kg (150 lb)   SpO2 95%   BMI 25.75 kg/m²     Current Shift:  12/27 0701 - 12/27 1900  In: 200 [P.O.:200]  Out: -   Last three shifts:  12/25 1901 - 12/27 0700  In: 300 [P.O.:300]  Out: 1450 [Urine:1450]            Labs: Results:       Chemistry Recent Labs     12/25/24  0509 12/26/24  0507 12/27/24  0335    143 142   K 3.9 3.6 3.5    109 110   CO2 32 28 30   BUN 17 16 17   GLOB 3.7 3.1 3.6   ,No results found for: \"LACTA\"   CBC w/Diff Recent Labs     12/25/24  0509 12/26/24  1731 12/27/24  0335   WBC 3.5* 5.0 3.8*   RBC 3.73* 3.92* 3.69*   HGB 12.1 12.6 12.0   HCT 36.6 37.9 36.1   * 179 158      Cardiac Enzymes Lab Results   Component Value Date    TROPHS 45 12/25/2024   ,No results found for: \"BNP\"   Coagulation Recent Labs     12/24/24  1529 12/25/24  0509 12/26/24  0507 12/27/24  0335   INR 1.9*   < > 3.3* 4.1*   APTT 44.6*  --   --   --     < > = values in this interval not displayed.       Lipid Panel No results found for: \"CHOL\", \"CHOLPOCT\", \"CHLST\", \"CHOLV\", \"977025\", \"HDL\", \"HDLC\", \"LDL\", \"LDLC\", \"VLDLC\", \"VLDL\"   Pancreas No results for input(s): \"LIPASE\" in the last 72 hours.,No results for input(s): \"AMYLASE\" in the last 72 hours.   Liver Enzymes No results for input(s): \"TP\" in the last 72 hours.    Invalid input(s): \"ALB\", \"TBIL\", \"AP\", \"SGOT\", \"GPT\", \"DBIL\"   Thyroid Studies No results found for: \"T4\", \"T3RU\", \"TSH\"     Procedures/imaging: see electronic medical records for all procedures/Xrays and details which were not copied into this note but were reviewed prior to creation of Plan    TIME: E/M Time spent with patient and patient care issues: 55 mins.     This time also includes physician non-face-to-face service time

## 2024-12-27 NOTE — PROGRESS NOTES
83 Y/o female full code    Arrived 12/24/24 c/o difficulty breathing for a week. Used BiPAP with EMS. Positive for cough fever and oxygen was saturating between 70 and 80 on room air. Was given solumedrol on the way to hospital. Per patient she broke her left leg and is unable to walk    History: asthma, broken left leg, mechanical aortic valve replacement    Diagnosis: acute respiratory failure with hypoxemia, COPD exacerbation, HTN, Cardiomyopathy, poor mobility,       Neuro alert and oriented, no pain, no fevers  Resp Room air  Lines extended dwell   Cardiac tele box 35          Plan: receiving vancomycin                                1920 bedside turnover with initial assessment of 5 ps. Denies pain at this time. Is  relaxed in bed on the cardiac telemetry monitor with call bell in her hand.    2000 vital signs and pain assessed. Patient reported no needs or pain at this time  2100 water and medications given with a warm blanket   2200 in bed watching tv no signs of     2300 in bed asleep on cardiac telemetry monitor. Bed is in the lowest position with the wheels locked and alarm on. Pt agrees to use the call bell for any needs. She told me she can't get up.

## 2024-12-27 NOTE — PLAN OF CARE
Problem: Discharge Planning  Goal: Discharge to home or other facility with appropriate resources  12/27/2024 1103 by Alfreda Mills RN  Outcome: Progressing  Flowsheets (Taken 12/27/2024 0817)  Discharge to home or other facility with appropriate resources: Identify barriers to discharge with patient and caregiver  12/27/2024 0351 by Chrissy Rivera RN  Outcome: Progressing  Flowsheets  Taken 12/26/2024 2001 by Chrissy Rivera RN  Discharge to home or other facility with appropriate resources:   Identify barriers to discharge with patient and caregiver   Arrange for needed discharge resources and transportation as appropriate   Identify discharge learning needs (meds, wound care, etc)   Refer to discharge planning if patient needs post-hospital services based on physician order or complex needs related to functional status, cognitive ability or social support system  Taken 12/26/2024 1715 by Alfreda Mills RN  Discharge to home or other facility with appropriate resources: Identify barriers to discharge with patient and caregiver     Problem: Safety - Adult  Goal: Free from fall injury  12/27/2024 1103 by Alfreda Mills RN  Outcome: Progressing  12/27/2024 0351 by Chrissy Rivera RN  Outcome: Progressing  Flowsheets (Taken 12/26/2024 2256)  Free From Fall Injury:   Instruct family/caregiver on patient safety   Based on caregiver fall risk screen, instruct family/caregiver to ask for assistance with transferring infant if caregiver noted to have fall risk factors     Problem: Chronic Conditions and Co-morbidities  Goal: Patient's chronic conditions and co-morbidity symptoms are monitored and maintained or improved  12/27/2024 1103 by Alfreda Mills RN  Outcome: Progressing  Flowsheets (Taken 12/27/2024 0817)  Care Plan - Patient's Chronic Conditions and Co-Morbidity Symptoms are Monitored and Maintained or Improved: Monitor and assess patient's chronic conditions and comorbid symptoms for stability, deterioration, or

## 2024-12-27 NOTE — PROGRESS NOTES
Spiritual Health History and Assessment/Progress Note  [facility]    Spiritual/Emotional Needs,  ,  ,      Name: Ritika Clancy MRN: [id number]    Age: 84 y.o.     Sex: female   Language: English   Denominational: None   Acute respiratory failure with hypoxia     Date: 12/27/2024            Total Time Calculated: 14 min              Spiritual Assessment began in [address] CARDIAC/MEDICAL        Referral/Consult From: Rounding   Encounter Overview/Reason: Spiritual/Emotional Needs  Service Provided For: Patient    [place], Belief, Meaning:   Patient has beliefs or practices that help with coping during difficult times  Family/Friends No family/friends present      Importance and Influence:  Patient has spiritual/personal beliefs that influence decisions regarding their health  Family/Friends No family/friends present    Community:  Patient is connected with a spiritual community  Family/Friends No family/friends present    Assessment and Plan of Care:     Patient Interventions include: Facilitated expression of thoughts and feelings  Family/Friends Interventions include: No family/friends present    Patient Plan of Care: No spiritual needs identified for follow-up  Family/Friends Plan of Care: No family/friends present    Electronically signed by Chaplain Leticia on 12/27/2024 at 11:05 AM

## 2024-12-27 NOTE — PROGRESS NOTES
Pharmacy Routine Monitoring of Warfarin (INR)  Active order for anticoagulants/antiplatelets: Warfarin Dosing  Significant drug interactions: No obvious interactions  Goal INR: 2 - 3  Recent Labs     12/25/24  0509 12/26/24  0507 12/26/24  1731 12/27/24  0335   INR 2.3* 3.3*  --  4.1*   HGB 12.1  --  12.6 12.0     Date INR Dose  12/27     4.1       Previous Dose HELD on 12/26   Recent warfarin administrations                     warfarin (COUMADIN) tablet 2 mg (mg) 2 mg Given 12/25/24 1821    warfarin (COUMADIN) tablet 5 mg (mg) 5 mg Given 12/24/24 2329                  Assessment/Plan:       INR orders are placed.    Current INR is supra-therapeutic .  Recommendation: Hold dose for 12/27     Active problem list reviewed.  INR orders are placed.  Chart reviewed for pertinent labs, drug/diet interactions, and past doses.  Documentation of patient's clinical condition was reviewed.    Pharmacy Dosing:  Pharmacy will continue to follow.    Thank you for the consult,   Drea Rolon, PharmD

## 2024-12-28 ENCOUNTER — APPOINTMENT (OUTPATIENT)
Facility: HOSPITAL | Age: 84
End: 2024-12-28
Payer: MEDICARE

## 2024-12-28 LAB
BASOPHILS # BLD: 0 K/UL (ref 0–0.1)
BASOPHILS NFR BLD: 0 % (ref 0–2)
DIFFERENTIAL METHOD BLD: ABNORMAL
EOSINOPHIL # BLD: 0 K/UL (ref 0–0.4)
EOSINOPHIL NFR BLD: 0 % (ref 0–5)
ERYTHROCYTE [DISTWIDTH] IN BLOOD BY AUTOMATED COUNT: 12 % (ref 11.6–14.5)
HCT VFR BLD AUTO: 35.9 % (ref 35–45)
HGB BLD-MCNC: 11.9 G/DL (ref 12–16)
IMM GRANULOCYTES # BLD AUTO: 0 K/UL (ref 0–0.04)
IMM GRANULOCYTES NFR BLD AUTO: 1 % (ref 0–0.5)
INR PPP: 3.8 (ref 0.9–1.1)
LYMPHOCYTES # BLD: 0.4 K/UL (ref 0.9–3.6)
LYMPHOCYTES NFR BLD: 13 % (ref 21–52)
MCH RBC QN AUTO: 31.8 PG (ref 24–34)
MCHC RBC AUTO-ENTMCNC: 33.1 G/DL (ref 31–37)
MCV RBC AUTO: 96 FL (ref 78–100)
MONOCYTES # BLD: 0.4 K/UL (ref 0.05–1.2)
MONOCYTES NFR BLD: 11 % (ref 3–10)
NEUTS SEG # BLD: 2.5 K/UL (ref 1.8–8)
NEUTS SEG NFR BLD: 75 % (ref 40–73)
NRBC # BLD: 0 K/UL (ref 0–0.01)
NRBC BLD-RTO: 0 PER 100 WBC
PLATELET # BLD AUTO: 167 K/UL (ref 135–420)
PMV BLD AUTO: 10.6 FL (ref 9.2–11.8)
PROTHROMBIN TIME: 37.9 SEC (ref 11.9–14.9)
RBC # BLD AUTO: 3.74 M/UL (ref 4.2–5.3)
WBC # BLD AUTO: 3.3 K/UL (ref 4.6–13.2)

## 2024-12-28 PROCEDURE — 6360000002 HC RX W HCPCS: Performed by: INTERNAL MEDICINE

## 2024-12-28 PROCEDURE — 2580000003 HC RX 258: Performed by: INTERNAL MEDICINE

## 2024-12-28 PROCEDURE — 1100000003 HC PRIVATE W/ TELEMETRY

## 2024-12-28 PROCEDURE — 85025 COMPLETE CBC W/AUTO DIFF WBC: CPT

## 2024-12-28 PROCEDURE — 94669 MECHANICAL CHEST WALL OSCILL: CPT

## 2024-12-28 PROCEDURE — 2500000003 HC RX 250 WO HCPCS: Performed by: EMERGENCY MEDICINE

## 2024-12-28 PROCEDURE — 2500000003 HC RX 250 WO HCPCS: Performed by: INTERNAL MEDICINE

## 2024-12-28 PROCEDURE — 6370000000 HC RX 637 (ALT 250 FOR IP): Performed by: HOSPITALIST

## 2024-12-28 PROCEDURE — 71045 X-RAY EXAM CHEST 1 VIEW: CPT

## 2024-12-28 PROCEDURE — 36415 COLL VENOUS BLD VENIPUNCTURE: CPT

## 2024-12-28 PROCEDURE — 85610 PROTHROMBIN TIME: CPT

## 2024-12-28 PROCEDURE — 2700000000 HC OXYGEN THERAPY PER DAY

## 2024-12-28 PROCEDURE — 2500000003 HC RX 250 WO HCPCS: Performed by: HOSPITALIST

## 2024-12-28 PROCEDURE — 6370000000 HC RX 637 (ALT 250 FOR IP): Performed by: INTERNAL MEDICINE

## 2024-12-28 PROCEDURE — 94640 AIRWAY INHALATION TREATMENT: CPT

## 2024-12-28 RX ADMIN — BUDESONIDE 500 MCG: 0.5 INHALANT RESPIRATORY (INHALATION) at 19:39

## 2024-12-28 RX ADMIN — IPRATROPIUM BROMIDE AND ALBUTEROL SULFATE 1 DOSE: .5; 2.5 SOLUTION RESPIRATORY (INHALATION) at 15:51

## 2024-12-28 RX ADMIN — IPRATROPIUM BROMIDE AND ALBUTEROL SULFATE 1 DOSE: .5; 2.5 SOLUTION RESPIRATORY (INHALATION) at 07:18

## 2024-12-28 RX ADMIN — IPRATROPIUM BROMIDE AND ALBUTEROL SULFATE 1 DOSE: .5; 2.5 SOLUTION RESPIRATORY (INHALATION) at 19:39

## 2024-12-28 RX ADMIN — AZITHROMYCIN MONOHYDRATE 500 MG: 500 INJECTION, POWDER, LYOPHILIZED, FOR SOLUTION INTRAVENOUS at 18:37

## 2024-12-28 RX ADMIN — PANTOPRAZOLE SODIUM 40 MG: 40 TABLET, DELAYED RELEASE ORAL at 05:30

## 2024-12-28 RX ADMIN — SODIUM CHLORIDE, PRESERVATIVE FREE 10 ML: 5 INJECTION INTRAVENOUS at 21:20

## 2024-12-28 RX ADMIN — IPRATROPIUM BROMIDE AND ALBUTEROL SULFATE 1 DOSE: .5; 2.5 SOLUTION RESPIRATORY (INHALATION) at 11:35

## 2024-12-28 RX ADMIN — WATER 2000 MG: 1 INJECTION INTRAMUSCULAR; INTRAVENOUS; SUBCUTANEOUS at 02:07

## 2024-12-28 RX ADMIN — BUDESONIDE 500 MCG: 0.5 INHALANT RESPIRATORY (INHALATION) at 07:18

## 2024-12-28 RX ADMIN — SODIUM CHLORIDE, PRESERVATIVE FREE 10 ML: 5 INJECTION INTRAVENOUS at 10:17

## 2024-12-28 RX ADMIN — ARFORMOTEROL TARTRATE 15 MCG: 15 SOLUTION RESPIRATORY (INHALATION) at 19:39

## 2024-12-28 RX ADMIN — ARFORMOTEROL TARTRATE 15 MCG: 15 SOLUTION RESPIRATORY (INHALATION) at 07:18

## 2024-12-28 RX ADMIN — SODIUM CHLORIDE, PRESERVATIVE FREE 10 ML: 5 INJECTION INTRAVENOUS at 10:18

## 2024-12-28 RX ADMIN — ATORVASTATIN CALCIUM 40 MG: 20 TABLET, FILM COATED ORAL at 09:51

## 2024-12-28 RX ADMIN — PREDNISONE 40 MG: 20 TABLET ORAL at 09:51

## 2024-12-28 ASSESSMENT — PAIN SCALES - GENERAL: PAINLEVEL_OUTOF10: 0

## 2024-12-28 NOTE — PROGRESS NOTES
Hospitalist Progress Note    Patient: Ritika Clancy MRN: [id number]  [facility]: [id number]    YOB: 1940  Age: 84 y.o.  Sex: female    DOA: 12/24/2024 LOS:  LOS: 4 days          Chief Complain :  Cough, shortness of breath  84 y.o. female with COPD, hypertension, aortic valve replacement on Coumadin, cardiomyopathy presents to ER with concerns of shortness of breath and cough that has been progressively getting worse for the past 10 days.   Subjective:   Patient laying in bed, feels better, on oxygen by nasal cannula, encouraged incentive spirometry.    Assessment/Plan     Active Hospital Problems    Diagnosis     Acute respiratory failure with hypoxemia [J96.01]     COPD with acute exacerbation (HCC) [J44.1]     Primary hypertension [I10]     Status post mechanical aortic valve replacement [Z95.2]     Cardiomyopathy (HCC) [I42.9]     Acute respiratory failure with hypoxia [J96.01]           CRITICAL CARE PLAN     Resp   Acute respiratory failure with hypoxia  On oxygen by nasal cannula, wean as tolerated  CTA chest no PE, showed mucous plugging right middle lobe and right lower lobe with right middle lobe airspace opacity likely volume loss.  Started on Solu-Medrol, Brovana and Pulmicort.  DuoNeb as needed  Incentive spirometry.  PCCM following     ID   Follow up blood  cx.  Respiratory viral panel with positive influenza A  Out of window for Tamiflu  ANTIBIOTICS vancomycin, ceftriaxone, azithromycin.   Trend temps, wbc, LA improving.     CVS   Monitor HD.   Cardiomyopathy  Last echo June 2024 with EF of 45 to 50%, mildly decreased left ventricular systolic function.  Status post aortic valve replacement, mechanical    Extremities: No c/c/e  Psych:   Not anxious or agitated.  Neurologic:  No acute neurological deficit.         Vital signs/Intake and Output:  Visit Vitals  /73   Pulse 72   Temp 98.4 °F (36.9 °C) (Oral)   Resp 18   Ht 1.626 m (5' 4.02\")   Wt 65.5 kg (144 lb 6.4 oz)   SpO2 95%   BMI 24.77 kg/m²     Current Shift:  12/28 0701 - 12/28 1900  In: 120 [P.O.:120]  Out: -   Last three shifts:  12/26 1901 - 12/28 0700  In: 1150 [P.O.:1150]  Out: 1600 [Urine:1600]            Labs: Results:       Chemistry Recent Labs     12/26/24  0507 12/27/24  0335    142   K 3.6 3.5    110   CO2 28 30   BUN 16 17   GLOB 3.1 3.6   ,No results found for: \"LACTA\"   CBC w/Diff Recent Labs     12/26/24  1731 12/27/24  0335 12/28/24  0325   WBC 5.0 3.8* 3.3*   RBC 3.92* 3.69* 3.74*   HGB 12.6 12.0 11.9*   HCT 37.9 36.1 35.9    158 167      Cardiac Enzymes Lab Results   Component Value Date    TROPHS 45 12/25/2024   ,No results found for: \"BNP\"   Coagulation Recent Labs     12/27/24  0335 12/28/24  0325   INR 4.1* 3.8*       Lipid Panel No results found for: \"CHOL\", \"CHOLPOCT\", \"CHLST\", \"CHOLV\", \"665392\", \"HDL\", \"HDLC\", \"LDL\", \"LDLC\", \"VLDLC\", \"VLDL\"   Pancreas No results for input(s): \"LIPASE\" in the last 72 hours.,No results for input(s): \"AMYLASE\" in the last 72 hours.   Liver Enzymes No results for input(s): \"TP\" in the last 72 hours.    Invalid input(s): \"ALB\", \"TBIL\", \"AP\", \"SGOT\", \"GPT\", \"DBIL\"   Thyroid Studies No results found for: \"T4\", \"T3RU\", \"TSH\"     Procedures/imaging: see electronic medical records for all procedures/Xrays and details which were not copied into this note but were reviewed prior to creation of Plan    TIME: E/M Time spent with patient and patient care issues: 55 mins.     This time also includes physician non-face-to-face service time visit on the date of service such as  Preparing to see the patient (eg, review of tests)  Obtaining and/or reviewing separately obtained history  Performing

## 2024-12-28 NOTE — PLAN OF CARE
Problem: Discharge Planning  Goal: Discharge to home or other facility with appropriate resources  12/27/2024 2136 by Halina Gomez RN  Outcome: Progressing  12/27/2024 1103 by Alfreda Mills RN  Outcome: Progressing  Flowsheets (Taken 12/27/2024 0817)  Discharge to home or other facility with appropriate resources: Identify barriers to discharge with patient and caregiver     Problem: Safety - Adult  Goal: Free from fall injury  12/27/2024 2136 by Halina Gomez RN  Outcome: Progressing  12/27/2024 1103 by Alfreda Mills RN  Outcome: Progressing     Problem: Chronic Conditions and Co-morbidities  Goal: Patient's chronic conditions and co-morbidity symptoms are monitored and maintained or improved  12/27/2024 2136 by Halina Gomez RN  Outcome: Progressing  12/27/2024 1103 by Alfreda Mills RN  Outcome: Progressing  Flowsheets (Taken 12/27/2024 0817)  Care Plan - Patient's Chronic Conditions and Co-Morbidity Symptoms are Monitored and Maintained or Improved: Monitor and assess patient's chronic conditions and comorbid symptoms for stability, deterioration, or improvement     Problem: Skin/Tissue Integrity  Goal: Absence of new skin breakdown  Description: 1.  Monitor for areas of redness and/or skin breakdown  2.  Assess vascular access sites hourly  3.  Every 4-6 hours minimum:  Change oxygen saturation probe site  4.  Every 4-6 hours:  If on nasal continuous positive airway pressure, respiratory therapy assess nares and determine need for appliance change or resting period.  12/27/2024 1103 by Alfreda Mills RN  Outcome: Progressing     Problem: ABCDS Injury Assessment  Goal: Absence of physical injury  12/27/2024 1103 by Alfreda Mills RN  Outcome: Progressing

## 2024-12-28 NOTE — PROGRESS NOTES
evaluation of pulmonary embolism to the first subsegmental arterial level. There is no pulmonary embolism to this level. Thyroid hypodensities median sternotomy changes. MEDIASTINUM: No mass or lymphadenopathy. MARZENA: No mass or lymphadenopathy. THORACIC AORTA: No aneurysm. HEART: Normal in size. ESOPHAGUS: Cardiomegaly. No coronary calcifications are identified TRACHEA/BRONCHI: Mucous plugging in the right lower lobe and right middle lobe bronchi PLEURA: No effusion or pneumothorax. LUNGS: Emphysema. Airspace opacity likely volume loss in the right middle lobe UPPER ABDOMEN: Unremarkable BONES: Superior endplate compression deformity of T11 of uncertain age.   1. No evidence of pulmonary embolus. 2. Mucous plug in the right middle lobe and right lower lobe with right middle lobe airspace opacity likely volume loss. Electronically signed by Frankie Kerr      XR CHEST PORTABLE  Result Date: 12/24/2024  EXAM: XR CHEST PORTABLE ACC#: [id number]  INDICATION: cough, hypoxia to 70%  COMPARISON: None. TECHNIQUE: AP portable semierect view of the chest. FINDINGS: Lungs are clear. The cardiomediastinal configuration is within normal limits. No acute bony abnormalities. Median sternotomy wires are visualized.   No acute cardiopulmonary abnormalities. Electronically signed by ADENIKE Jorge       ECHO 6/3/2024  [place] Echo: Complete with Contrast Protocol  Narrative    Left Ventricle: Left ventricle size is normal. Normal wall thickness .  There is abnormal septal motion. The ejection fraction by visual  approximation is 45 - 50%. There is mildly reduced systolic dysfunction.  Unable to assess diastology due to poor image quality.    Right Ventricle: Right ventricle size is normal. Normal systolic  function.    Left Atrium: Left atrium size is normal.    Aortic Valve: Mechanical valve. No stenosis. Normal prosthetic  gradient. AV mean gradient is 6.00 mmHg. AV peak gradient is 12.53 mmHg.  No transvalvular regurgitation. No  paravalvular regurgitation.    Tricuspid Valve: Mild to moderate (1-2+) transvalvular regurgitation  with a centrally directed jet. Mild pulmonary artery hypertension. RVSP is  36.0 mmHg.          Please note: Voice-recognition software may have been used to generate this report, which may have resulted in some phonetic-based errors in grammar and contents. Even though attempts were made to correct all the mistakes, some may have been missed, and remained in the body of the document.      Norman Olivia MD  12/28/2024

## 2024-12-28 NOTE — PLAN OF CARE
Problem: Discharge Planning  Goal: Discharge to home or other facility with appropriate resources  12/28/2024 1803 by Jethro Rosado RN  Outcome: Progressing  12/28/2024 1050 by Miriam Vance RN  Outcome: Progressing     Problem: Safety - Adult  Goal: Free from fall injury  12/28/2024 1803 by Jethro Rosado RN  Outcome: Progressing  12/28/2024 1050 by Miriam Vance RN  Outcome: Progressing     Problem: Chronic Conditions and Co-morbidities  Goal: Patient's chronic conditions and co-morbidity symptoms are monitored and maintained or improved  12/28/2024 1803 by Jethro Rosado RN  Outcome: Progressing  12/28/2024 1050 by Miriam Vance RN  Outcome: Progressing     Problem: Skin/Tissue Integrity  Goal: Absence of new skin breakdown  Description: 1.  Monitor for areas of redness and/or skin breakdown  2.  Assess vascular access sites hourly  3.  Every 4-6 hours minimum:  Change oxygen saturation probe site  4.  Every 4-6 hours:  If on nasal continuous positive airway pressure, respiratory therapy assess nares and determine need for appliance change or resting period.  12/28/2024 1803 by Jethro Rosado RN  Outcome: Progressing  12/28/2024 1050 by Miriam Vance RN  Outcome: Progressing     Problem: ABCDS Injury Assessment  Goal: Absence of physical injury  12/28/2024 1803 by Jethro Rosado RN  Outcome: Progressing  12/28/2024 1050 by Miriam Vance RN  Outcome: Progressing

## 2024-12-28 NOTE — PLAN OF CARE
Problem: Discharge Planning  Goal: Discharge to home or other facility with appropriate resources  12/28/2024 1050 by Miriam Vance RN  Outcome: Progressing  12/27/2024 2136 by Halina Gomez RN  Outcome: Progressing     Problem: Safety - Adult  Goal: Free from fall injury  12/28/2024 1050 by Miriam Vance RN  Outcome: Progressing  12/27/2024 2136 by Halina Gomez RN  Outcome: Progressing     Problem: Chronic Conditions and Co-morbidities  Goal: Patient's chronic conditions and co-morbidity symptoms are monitored and maintained or improved  12/28/2024 1050 by Miriam Vance RN  Outcome: Progressing  12/27/2024 2136 by Halina Gomez RN  Outcome: Progressing     Problem: Skin/Tissue Integrity  Goal: Absence of new skin breakdown  Description: 1.  Monitor for areas of redness and/or skin breakdown  2.  Assess vascular access sites hourly  3.  Every 4-6 hours minimum:  Change oxygen saturation probe site  4.  Every 4-6 hours:  If on nasal continuous positive airway pressure, respiratory therapy assess nares and determine need for appliance change or resting period.  Outcome: Progressing     Problem: ABCDS Injury Assessment  Goal: Absence of physical injury  Outcome: Progressing

## 2024-12-28 NOTE — PROGRESS NOTES
Repositioning offered to patient but refused, only let this RN reposition BLE, states she feels comfortable.

## 2024-12-28 NOTE — PROGRESS NOTES
Warfarin Dosing - Pharmacy Consult Note    Consulting Provider: Dr. Norman Olivia      Indication:  Mechanical Heart Valve (atrial)    Warfarin Dose prior to admission: Warfarin 5 mg PO tablet daily      Concurrent anticoagulants/antiplatelets: none  Significant Drug Interactions: macrolides (incr, INR), Ceftriaxone    Recent Labs     12/26/24  0507 12/26/24  1731 12/27/24  0335 12/28/24  0325   INR 3.3*  --  4.1* 3.8*   HGB  --  12.6 12.0 11.9*   PLT  --  179 158 167     Recent warfarin administrations                     warfarin (COUMADIN) tablet 2 mg (mg) 2 mg Given 12/25/24 1821                   Date     INR      Dose  12/28       3.8        Previous dose held for 12/27    Assessment/Plan  (Goal INR: 2 - 3)  Holding dose of warfarin for tonight  INR currently at 3.8  (Will reevaluate based on INR tomorrow)    Active problem list reviewed.  INR orders are placed.  Chart reviewed for pertinent labs, drug/diet interactions, and past doses.  Documentation of patient's clinical condition was reviewed.    Pharmacy Dosing:  Pharmacy will continue to follow.

## 2024-12-29 LAB
INR PPP: 2.5 (ref 0.9–1.1)
PROTHROMBIN TIME: 27.4 SEC (ref 11.9–14.9)

## 2024-12-29 PROCEDURE — 6370000000 HC RX 637 (ALT 250 FOR IP): Performed by: HOSPITALIST

## 2024-12-29 PROCEDURE — 94640 AIRWAY INHALATION TREATMENT: CPT

## 2024-12-29 PROCEDURE — 6370000000 HC RX 637 (ALT 250 FOR IP): Performed by: INTERNAL MEDICINE

## 2024-12-29 PROCEDURE — 2700000000 HC OXYGEN THERAPY PER DAY

## 2024-12-29 PROCEDURE — 2500000003 HC RX 250 WO HCPCS: Performed by: EMERGENCY MEDICINE

## 2024-12-29 PROCEDURE — 1100000003 HC PRIVATE W/ TELEMETRY

## 2024-12-29 PROCEDURE — 2500000003 HC RX 250 WO HCPCS: Performed by: INTERNAL MEDICINE

## 2024-12-29 PROCEDURE — 85610 PROTHROMBIN TIME: CPT

## 2024-12-29 PROCEDURE — 2500000003 HC RX 250 WO HCPCS: Performed by: HOSPITALIST

## 2024-12-29 PROCEDURE — 36415 COLL VENOUS BLD VENIPUNCTURE: CPT

## 2024-12-29 PROCEDURE — 94669 MECHANICAL CHEST WALL OSCILL: CPT

## 2024-12-29 PROCEDURE — 6360000002 HC RX W HCPCS: Performed by: INTERNAL MEDICINE

## 2024-12-29 RX ORDER — IPRATROPIUM BROMIDE AND ALBUTEROL SULFATE 2.5; .5 MG/3ML; MG/3ML
1 SOLUTION RESPIRATORY (INHALATION)
Status: DISCONTINUED | OUTPATIENT
Start: 2024-12-30 | End: 2025-01-02

## 2024-12-29 RX ORDER — BUDESONIDE 0.5 MG/2ML
0.5 INHALANT ORAL
Status: DISCONTINUED | OUTPATIENT
Start: 2024-12-30 | End: 2025-01-04 | Stop reason: HOSPADM

## 2024-12-29 RX ORDER — WARFARIN SODIUM 1 MG/1
2 TABLET ORAL
Status: COMPLETED | OUTPATIENT
Start: 2024-12-29 | End: 2024-12-29

## 2024-12-29 RX ORDER — BISACODYL 5 MG/1
5 TABLET, DELAYED RELEASE ORAL DAILY PRN
Status: DISCONTINUED | OUTPATIENT
Start: 2024-12-29 | End: 2025-01-04 | Stop reason: HOSPADM

## 2024-12-29 RX ORDER — ARFORMOTEROL TARTRATE 15 UG/2ML
15 SOLUTION RESPIRATORY (INHALATION)
Status: DISCONTINUED | OUTPATIENT
Start: 2024-12-30 | End: 2025-01-04 | Stop reason: HOSPADM

## 2024-12-29 RX ADMIN — WATER 2000 MG: 1 INJECTION INTRAMUSCULAR; INTRAVENOUS; SUBCUTANEOUS at 02:40

## 2024-12-29 RX ADMIN — IPRATROPIUM BROMIDE AND ALBUTEROL SULFATE 1 DOSE: .5; 2.5 SOLUTION RESPIRATORY (INHALATION) at 15:49

## 2024-12-29 RX ADMIN — SODIUM CHLORIDE, PRESERVATIVE FREE 10 ML: 5 INJECTION INTRAVENOUS at 08:23

## 2024-12-29 RX ADMIN — IPRATROPIUM BROMIDE AND ALBUTEROL SULFATE 1 DOSE: .5; 2.5 SOLUTION RESPIRATORY (INHALATION) at 11:24

## 2024-12-29 RX ADMIN — PREDNISONE 40 MG: 20 TABLET ORAL at 08:22

## 2024-12-29 RX ADMIN — SODIUM CHLORIDE, PRESERVATIVE FREE 10 ML: 5 INJECTION INTRAVENOUS at 21:07

## 2024-12-29 RX ADMIN — IPRATROPIUM BROMIDE AND ALBUTEROL SULFATE 1 DOSE: .5; 2.5 SOLUTION RESPIRATORY (INHALATION) at 07:31

## 2024-12-29 RX ADMIN — WARFARIN SODIUM 2 MG: 1 TABLET ORAL at 17:15

## 2024-12-29 RX ADMIN — ATORVASTATIN CALCIUM 40 MG: 20 TABLET, FILM COATED ORAL at 08:22

## 2024-12-29 RX ADMIN — BUDESONIDE 500 MCG: 0.5 INHALANT RESPIRATORY (INHALATION) at 07:31

## 2024-12-29 RX ADMIN — PANTOPRAZOLE SODIUM 40 MG: 40 TABLET, DELAYED RELEASE ORAL at 06:15

## 2024-12-29 RX ADMIN — SODIUM CHLORIDE, PRESERVATIVE FREE 10 ML: 5 INJECTION INTRAVENOUS at 08:22

## 2024-12-29 RX ADMIN — ARFORMOTEROL TARTRATE 15 MCG: 15 SOLUTION RESPIRATORY (INHALATION) at 07:31

## 2024-12-29 RX ADMIN — SODIUM CHLORIDE, PRESERVATIVE FREE 10 ML: 5 INJECTION INTRAVENOUS at 21:08

## 2024-12-29 ASSESSMENT — PAIN SCALES - GENERAL: PAINLEVEL_OUTOF10: 0

## 2024-12-29 NOTE — PROGRESS NOTES
Warfarin Dosing - Pharmacy Consult Note    Consulting Provider: Dr. Norman Olivia       Indication:  Mechanical Heart Valve (atrial)     Warfarin Dose prior to admission: Warfarin 5 mg PO tablet daily         Concurrent anticoagulants/antiplatelets: none  Significant Drug Interactions:  Ceftriaxone    Recent Labs     12/26/24  1731 12/27/24  0335 12/28/24  0325 12/29/24  0114   INR  --  4.1* 3.8* 2.5*   HGB 12.6 12.0 11.9*  --     158 167  --      Recent warfarin administrations        No warfarin orders with administrations found.            Orders not given:            Warfarin: Pharmacy to dose    Warfarin dose HELD tonight - INR 3.3    warfarin (COUMADIN) tablet 2 mg                   Date         INR  Dose  12/29/24     2.5            Previous dose held for 12/28/24    Assessment/Plan  (Goal INR: 2 - 3)  Warfarin 2mg ordered for tonight  INR currently at 2.5  (Will reevaluate based on INR tomorrow)    Active problem list reviewed.  INR orders are placed.  Chart reviewed for pertinent labs, drug/diet interactions, and past doses.  Documentation of patient's clinical condition was reviewed.    Pharmacy Dosing:  Pharmacy will continue to follow.

## 2024-12-29 NOTE — PROGRESS NOTES
Cardiac Enzymes Lab Results   Component Value Date    TROPHS 45 12/25/2024   ,No results found for: \"BNP\"   Coagulation Recent Labs     12/28/24  0325 12/29/24  0114   INR 3.8* 2.5*       Lipid Panel No results found for: \"CHOL\", \"CHOLPOCT\", \"CHLST\", \"CHOLV\", \"905551\", \"HDL\", \"HDLC\", \"LDL\", \"LDLC\", \"VLDLC\", \"VLDL\"   Pancreas No results for input(s): \"LIPASE\" in the last 72 hours.,No results for input(s): \"AMYLASE\" in the last 72 hours.   Liver Enzymes No results for input(s): \"TP\" in the last 72 hours.    Invalid input(s): \"ALB\", \"TBIL\", \"AP\", \"SGOT\", \"GPT\", \"DBIL\"   Thyroid Studies No results found for: \"T4\", \"T3RU\", \"TSH\"     Procedures/imaging: see electronic medical records for all procedures/Xrays and details which were not copied into this note but were reviewed prior to creation of Plan    TIME: E/M Time spent with patient and patient care issues: 55 mins.     This time also includes physician non-face-to-face service time visit on the date of service such as  Preparing to see the patient (eg, review of tests)  Obtaining and/or reviewing separately obtained history  Performing a medically necessary appropriate examination and/or evaluation  Counseling and educating the patient/family/caregiver  Ordering medications, tests, or procedures  Referring and communicating with other health care professionals as needed  Documenting clinical information in the electronic or other health record  Independently interpreting results (not reported separately) and communicating results to the patient/family/caregiver  Care coordination and discharge planning with Case Management.    Dear patient or family member or Caretaker, if you are reviewing this note and have a question regarding the medical terminology, please bring it with you to your next PCP visit.  Medical notes are meant to be a communication between medical professionals.  Additionally, portion of this note were created using voice recognition function,  syntax and phonetic over may have escaped proofreading.

## 2024-12-29 NOTE — PLAN OF CARE
Problem: Discharge Planning  Goal: Discharge to home or other facility with appropriate resources  12/28/2024 2247 by Halina Gomez RN  Outcome: Progressing  12/28/2024 1803 by Jethro Rosado RN  Outcome: Progressing  12/28/2024 1050 by Miriam Vance RN  Outcome: Progressing     Problem: Safety - Adult  Goal: Free from fall injury  12/28/2024 2247 by Halina Gomez RN  Outcome: Progressing  12/28/2024 1803 by Jethro Rosado RN  Outcome: Progressing  12/28/2024 1050 by Miriam Vance RN  Outcome: Progressing     Problem: Chronic Conditions and Co-morbidities  Goal: Patient's chronic conditions and co-morbidity symptoms are monitored and maintained or improved  12/28/2024 2247 by Halina Gomez RN  Outcome: Progressing  12/28/2024 1803 by Jethro Rosado RN  Outcome: Progressing  12/28/2024 1050 by Miriam Vance RN  Outcome: Progressing     Problem: Skin/Tissue Integrity  Goal: Absence of new skin breakdown  Description: 1.  Monitor for areas of redness and/or skin breakdown  2.  Assess vascular access sites hourly  3.  Every 4-6 hours minimum:  Change oxygen saturation probe site  4.  Every 4-6 hours:  If on nasal continuous positive airway pressure, respiratory therapy assess nares and determine need for appliance change or resting period.  12/28/2024 2247 by Halina Gomez RN  Outcome: Progressing  12/28/2024 1803 by Jethro Rosado RN  Outcome: Progressing  12/28/2024 1050 by Miriam Vance RN  Outcome: Progressing     Problem: ABCDS Injury Assessment  Goal: Absence of physical injury  12/28/2024 2247 by Halina Gomez RN  Outcome: Progressing  12/28/2024 1803 by Jethro Rosado RN  Outcome: Progressing  12/28/2024 1050 by Miriam Vance RN  Outcome: Progressing

## 2024-12-29 NOTE — PROGRESS NOTES
[facility]  Pulmonary, Critical Care, and Sleep Medicine    Name: Ritika Clancy MRN: [id number]   : 1940 Hospital: [facility]    Date: 2024  Room: [address] [place] [address] Note                                                                             Consult requesting physician: Darron Seth PA-C   Reason for Consult: COPD exacerbation    IMPRESSION:     Acute respiratory failure with hypoxemia  COPD exacerbation  HTN  Cardiomyopathy  Poor mobility  S/p mechanical aortic valve replacement      Active Hospital Problems    Diagnosis Date Noted    Acute respiratory failure with hypoxemia [J96.01] 2024    COPD with acute exacerbation (HCC) [J44.1] 2024    Primary hypertension [I10] 2024    Status post mechanical aortic valve replacement [Z95.2] 2024    Cardiomyopathy (HCC) [I42.9] 2024    Acute respiratory failure with hypoxia [J96.01] 2024         Code status: Full Code       RECOMMENDATIONS:     Patient with known emphysema/COPD, presenting with COPD exacerbation.  Influenza A-positive    Respiratory: Patient respirations and oxygenation improved; patient on nasal cannula oxygen-2 L; recommend incentive spirometry and wean off oxygen supplementation  CTA chest reviewed images and report-bilateral upper lobe centrilobular emphysematous changes; no focal consolidation; bilateral basal bronchial obstruction with mucus, and similar findings in right middle lobe bronchial airways as well; right middle lobe atelectasis noted in the lateral segments; no pleural effusions; no central or segmental PEs; enlarged central pulmonary artery suggestive of pulmonary hypertension.  Chest x-ray done -reviewed images and report-cardiac size normal, lungs clear, no focal infiltrates or consolidations, no pleural effusions, mechanical aortic valve seen with prior sternotomy  Bronchodilators-Brovana and budesonide nebs twice a  70%  COMPARISON: None. TECHNIQUE: AP portable semierect view of the chest. FINDINGS: Lungs are clear. The cardiomediastinal configuration is within normal limits. No acute bony abnormalities. Median sternotomy wires are visualized.   No acute cardiopulmonary abnormalities. Electronically signed by ADENIKE Jorge       ECHO 6/3/2024  [facility] Echo: Complete with Contrast Protocol  Narrative    Left Ventricle: Left ventricle size is normal. Normal wall thickness .  There is abnormal septal motion. The ejection fraction by visual  approximation is 45 - 50%. There is mildly reduced systolic dysfunction.  Unable to assess diastology due to poor image quality.    Right Ventricle: Right ventricle size is normal. Normal systolic  function.    Left Atrium: Left atrium size is normal.    Aortic Valve: Mechanical valve. No stenosis. Normal prosthetic  gradient. AV mean gradient is 6.00 mmHg. AV peak gradient is 12.53 mmHg.  No transvalvular regurgitation. No paravalvular regurgitation.    Tricuspid Valve: Mild to moderate (1-2+) transvalvular regurgitation  with a centrally directed jet. Mild pulmonary artery hypertension. RVSP is  36.0 mmHg.          Please note: Voice-recognition software may have been used to generate this report, which may have resulted in some phonetic-based errors in grammar and contents. Even though attempts were made to correct all the mistakes, some may have been missed, and remained in the body of the document.      Norman Olivia MD  12/29/2024

## 2024-12-29 NOTE — PLAN OF CARE
Problem: Discharge Planning  Goal: Discharge to home or other facility with appropriate resources  12/29/2024 0900 by Miriam Vance RN  Outcome: Progressing  12/28/2024 2247 by Halina Gomez RN  Outcome: Progressing     Problem: Safety - Adult  Goal: Free from fall injury  12/29/2024 0900 by Miriam Vance RN  Outcome: Progressing  12/28/2024 2247 by Halina Gomez RN  Outcome: Progressing     Problem: Chronic Conditions and Co-morbidities  Goal: Patient's chronic conditions and co-morbidity symptoms are monitored and maintained or improved  12/29/2024 0900 by Miriam Vance RN  Outcome: Progressing  12/28/2024 2247 by Halina Gomez RN  Outcome: Progressing     Problem: Skin/Tissue Integrity  Goal: Absence of new skin breakdown  Description: 1.  Monitor for areas of redness and/or skin breakdown  2.  Assess vascular access sites hourly  3.  Every 4-6 hours minimum:  Change oxygen saturation probe site  4.  Every 4-6 hours:  If on nasal continuous positive airway pressure, respiratory therapy assess nares and determine need for appliance change or resting period.  12/29/2024 0900 by Miriam Vance RN  Outcome: Progressing  12/28/2024 2247 by Halina Gomez, RN  Outcome: Progressing     Problem: ABCDS Injury Assessment  Goal: Absence of physical injury  12/29/2024 0900 by Miriam Vance RN  Outcome: Progressing  12/28/2024 2247 by Halina Gomez RN  Outcome: Progressing

## 2024-12-29 NOTE — PROGRESS NOTES
Physician Progress Note      PATIENT:               JACOBO YOUNGBLOOD  CSN #:                  [id number]  :                       1940  ADMIT DATE:       2024 1:36 PM  DISCH DATE:  RESPONDING  PROVIDER #:        Kate Bauman MD          QUERY TEXT:    Pt admitted with Acute respiratory failure with hypoxemia , Influenza A H1 +    and COPD with acute exacerbation. Pt noted to have 1st 24 hrs ADt:24    1:36 pm LAB- WBC 3.5  VS - RR 35 ,33,37 HR 96 ,107 , rectal TEMP 101.8 , bp   77/58 ,96/48 ,76/57 ,83/51,64/50 ,100/37  MAP 49 ,53, 59, and  Influenza A H1   + . If possible, please document in the progress notes and discharge summary   if you are evaluating and /or treating any of the following:  The medical record reflects the following:    Risk Factors: Acute respiratory failure with hypoxemia , Influenza A H1 + ,   COPD with acute exacerbation    Clinical Indicators: 1st 24 hrs ADt:24  1:36 pm LAB- WBC 3.5  VS - RR 35   ,33,37 HR 96 ,107 , rectal TEMP 101.8 , bp 77/58 ,96/48 ,76/57 ,83/51,64/50   ,100/37  MAP 49 ,53, 59,  Influenza A H1 +    ED MD IS-45-exgy-old female with past medical history of asthma resents to the   ER with 1 week of worsening cough and SOB .      Treatment: ED  IV NS bolus 1,000cc , IV azithromycin, IV maxipime , IV   vancomycin ,High flow oxygen , IV Solu-Medrol and was started on BiPAP then   NRB  Options provided:  -- Sepsis due to Influenza A H1 , present on admission  -- Influenza A H1 without Sepsis  -- Other - I will add my own diagnosis  -- Disagree - Not applicable / Not valid  -- Disagree - Clinically unable to determine / Unknown  -- Refer to Clinical Documentation Reviewer    PROVIDER RESPONSE TEXT:    This patient has sepsis due to Influenza A H1 which was present on admission.    Query created by: Marcela Williamson on 2024 5:15 AM      Electronically signed by:  Kate Bauman MD 2024 10:06 AM

## 2024-12-30 PROBLEM — J10.1 INFLUENZA A: Status: ACTIVE | Noted: 2024-12-30

## 2024-12-30 PROBLEM — Z87.81 HISTORY OF FEMUR FRACTURE: Status: ACTIVE | Noted: 2024-12-30

## 2024-12-30 PROBLEM — Z71.89 GOALS OF CARE, COUNSELING/DISCUSSION: Status: ACTIVE | Noted: 2024-12-30

## 2024-12-30 PROBLEM — Z51.5 ENCOUNTER FOR PALLIATIVE CARE: Status: ACTIVE | Noted: 2024-12-30

## 2024-12-30 LAB
ALBUMIN SERPL-MCNC: 2.5 G/DL (ref 3.4–5)
ALBUMIN/GLOB SERPL: 0.8 (ref 0.8–1.7)
ALP SERPL-CCNC: 45 U/L (ref 45–117)
ALT SERPL-CCNC: 67 U/L (ref 13–56)
ANION GAP SERPL CALC-SCNC: 3 MMOL/L (ref 3–18)
AST SERPL-CCNC: 45 U/L (ref 10–38)
BACTERIA SPEC CULT: NORMAL
BACTERIA SPEC CULT: NORMAL
BASOPHILS # BLD: 0 K/UL (ref 0–0.1)
BASOPHILS NFR BLD: 0 % (ref 0–2)
BILIRUB SERPL-MCNC: 0.9 MG/DL (ref 0.2–1)
BUN SERPL-MCNC: 16 MG/DL (ref 7–18)
BUN/CREAT SERPL: 32 (ref 12–20)
CALCIUM SERPL-MCNC: 9 MG/DL (ref 8.5–10.1)
CHLORIDE SERPL-SCNC: 110 MMOL/L (ref 100–111)
CO2 SERPL-SCNC: 31 MMOL/L (ref 21–32)
CREAT SERPL-MCNC: 0.5 MG/DL (ref 0.6–1.3)
DIFFERENTIAL METHOD BLD: ABNORMAL
EOSINOPHIL # BLD: 0 K/UL (ref 0–0.4)
EOSINOPHIL NFR BLD: 0 % (ref 0–5)
ERYTHROCYTE [DISTWIDTH] IN BLOOD BY AUTOMATED COUNT: 11.9 % (ref 11.6–14.5)
GLOBULIN SER CALC-MCNC: 3.1 G/DL (ref 2–4)
GLUCOSE SERPL-MCNC: 95 MG/DL (ref 74–99)
HCT VFR BLD AUTO: 35 % (ref 35–45)
HGB BLD-MCNC: 11.7 G/DL (ref 12–16)
IMM GRANULOCYTES # BLD AUTO: 0.1 K/UL (ref 0–0.04)
IMM GRANULOCYTES NFR BLD AUTO: 1 % (ref 0–0.5)
INR PPP: 2.6 (ref 0.9–1.1)
LYMPHOCYTES # BLD: 1.4 K/UL (ref 0.9–3.6)
LYMPHOCYTES NFR BLD: 24 % (ref 21–52)
MCH RBC QN AUTO: 32.3 PG (ref 24–34)
MCHC RBC AUTO-ENTMCNC: 33.4 G/DL (ref 31–37)
MCV RBC AUTO: 96.7 FL (ref 78–100)
MONOCYTES # BLD: 0.8 K/UL (ref 0.05–1.2)
MONOCYTES NFR BLD: 13 % (ref 3–10)
NEUTS SEG # BLD: 3.5 K/UL (ref 1.8–8)
NEUTS SEG NFR BLD: 61 % (ref 40–73)
NRBC # BLD: 0 K/UL (ref 0–0.01)
NRBC BLD-RTO: 0 PER 100 WBC
PLATELET # BLD AUTO: 194 K/UL (ref 135–420)
PMV BLD AUTO: 10.2 FL (ref 9.2–11.8)
POTASSIUM SERPL-SCNC: 3.2 MMOL/L (ref 3.5–5.5)
PROT SERPL-MCNC: 5.6 G/DL (ref 6.4–8.2)
PROTHROMBIN TIME: 27.8 SEC (ref 11.9–14.9)
RBC # BLD AUTO: 3.62 M/UL (ref 4.2–5.3)
SERVICE CMNT-IMP: NORMAL
SERVICE CMNT-IMP: NORMAL
SODIUM SERPL-SCNC: 144 MMOL/L (ref 136–145)
WBC # BLD AUTO: 5.8 K/UL (ref 4.6–13.2)

## 2024-12-30 PROCEDURE — 94640 AIRWAY INHALATION TREATMENT: CPT

## 2024-12-30 PROCEDURE — 6370000000 HC RX 637 (ALT 250 FOR IP): Performed by: INTERNAL MEDICINE

## 2024-12-30 PROCEDURE — 80053 COMPREHEN METABOLIC PANEL: CPT

## 2024-12-30 PROCEDURE — 85610 PROTHROMBIN TIME: CPT

## 2024-12-30 PROCEDURE — 2700000000 HC OXYGEN THERAPY PER DAY

## 2024-12-30 PROCEDURE — 2500000003 HC RX 250 WO HCPCS: Performed by: HOSPITALIST

## 2024-12-30 PROCEDURE — 85025 COMPLETE CBC W/AUTO DIFF WBC: CPT

## 2024-12-30 PROCEDURE — 2500000003 HC RX 250 WO HCPCS: Performed by: EMERGENCY MEDICINE

## 2024-12-30 PROCEDURE — 6370000000 HC RX 637 (ALT 250 FOR IP): Performed by: HOSPITALIST

## 2024-12-30 PROCEDURE — 6360000002 HC RX W HCPCS: Performed by: HOSPITALIST

## 2024-12-30 PROCEDURE — 36415 COLL VENOUS BLD VENIPUNCTURE: CPT

## 2024-12-30 PROCEDURE — 1100000003 HC PRIVATE W/ TELEMETRY

## 2024-12-30 PROCEDURE — 94669 MECHANICAL CHEST WALL OSCILL: CPT

## 2024-12-30 PROCEDURE — 99222 1ST HOSP IP/OBS MODERATE 55: CPT

## 2024-12-30 RX ORDER — PREDNISONE 20 MG/1
20 TABLET ORAL DAILY
Status: DISCONTINUED | OUTPATIENT
Start: 2024-12-31 | End: 2025-01-03

## 2024-12-30 RX ORDER — POTASSIUM CHLORIDE 1500 MG/1
40 TABLET, EXTENDED RELEASE ORAL 2 TIMES DAILY
Status: COMPLETED | OUTPATIENT
Start: 2024-12-30 | End: 2024-12-30

## 2024-12-30 RX ORDER — LACTULOSE 10 G/15ML
20 SOLUTION ORAL 3 TIMES DAILY
Status: DISPENSED | OUTPATIENT
Start: 2024-12-30 | End: 2024-12-31

## 2024-12-30 RX ORDER — WARFARIN SODIUM 1 MG/1
2 TABLET ORAL
Status: COMPLETED | OUTPATIENT
Start: 2024-12-30 | End: 2024-12-30

## 2024-12-30 RX ADMIN — ARFORMOTEROL TARTRATE 15 MCG: 15 SOLUTION RESPIRATORY (INHALATION) at 07:21

## 2024-12-30 RX ADMIN — IPRATROPIUM BROMIDE AND ALBUTEROL SULFATE 1 DOSE: .5; 2.5 SOLUTION RESPIRATORY (INHALATION) at 07:21

## 2024-12-30 RX ADMIN — ARFORMOTEROL TARTRATE 15 MCG: 15 SOLUTION RESPIRATORY (INHALATION) at 20:18

## 2024-12-30 RX ADMIN — BISACODYL 5 MG: 5 TABLET, COATED ORAL at 06:40

## 2024-12-30 RX ADMIN — WARFARIN SODIUM 2 MG: 1 TABLET ORAL at 20:43

## 2024-12-30 RX ADMIN — IPRATROPIUM BROMIDE AND ALBUTEROL SULFATE 1 DOSE: .5; 2.5 SOLUTION RESPIRATORY (INHALATION) at 20:18

## 2024-12-30 RX ADMIN — IPRATROPIUM BROMIDE AND ALBUTEROL SULFATE 1 DOSE: .5; 2.5 SOLUTION RESPIRATORY (INHALATION) at 11:36

## 2024-12-30 RX ADMIN — ATORVASTATIN CALCIUM 40 MG: 20 TABLET, FILM COATED ORAL at 09:44

## 2024-12-30 RX ADMIN — POLYETHYLENE GLYCOL 3350 17 G: 17 POWDER, FOR SOLUTION ORAL at 09:46

## 2024-12-30 RX ADMIN — BUDESONIDE 500 MCG: 0.5 INHALANT RESPIRATORY (INHALATION) at 20:18

## 2024-12-30 RX ADMIN — SODIUM CHLORIDE, PRESERVATIVE FREE 10 ML: 5 INJECTION INTRAVENOUS at 20:38

## 2024-12-30 RX ADMIN — PREDNISONE 40 MG: 20 TABLET ORAL at 09:44

## 2024-12-30 RX ADMIN — SODIUM CHLORIDE, PRESERVATIVE FREE 10 ML: 5 INJECTION INTRAVENOUS at 09:47

## 2024-12-30 RX ADMIN — PANTOPRAZOLE SODIUM 40 MG: 40 TABLET, DELAYED RELEASE ORAL at 06:40

## 2024-12-30 RX ADMIN — POTASSIUM CHLORIDE 40 MEQ: 1500 TABLET, EXTENDED RELEASE ORAL at 20:32

## 2024-12-30 RX ADMIN — BUDESONIDE 500 MCG: 0.5 INHALANT RESPIRATORY (INHALATION) at 07:21

## 2024-12-30 RX ADMIN — IPRATROPIUM BROMIDE AND ALBUTEROL SULFATE 1 DOSE: .5; 2.5 SOLUTION RESPIRATORY (INHALATION) at 15:40

## 2024-12-30 RX ADMIN — POTASSIUM CHLORIDE 40 MEQ: 1500 TABLET, EXTENDED RELEASE ORAL at 13:27

## 2024-12-30 ASSESSMENT — PAIN DESCRIPTION - LOCATION: LOCATION: ABDOMEN

## 2024-12-30 ASSESSMENT — PAIN SCALES - GENERAL
PAINLEVEL_OUTOF10: 0
PAINLEVEL_OUTOF10: 4

## 2024-12-30 NOTE — PLAN OF CARE
Problem: Discharge Planning  Goal: Discharge to home or other facility with appropriate resources  12/29/2024 2219 by Kaitlyn Ibarra RN  Outcome: Progressing  12/29/2024 0900 by Miriam Vance RN  Outcome: Progressing     Problem: Safety - Adult  Goal: Free from fall injury  12/29/2024 2219 by Kaitlyn Ibarra RN  Outcome: Progressing  12/29/2024 0900 by Miriam Vance RN  Outcome: Progressing     Problem: Chronic Conditions and Co-morbidities  Goal: Patient's chronic conditions and co-morbidity symptoms are monitored and maintained or improved  12/29/2024 2219 by Kaitlyn Ibarra RN  Outcome: Progressing  12/29/2024 0900 by Miriam Vance RN  Outcome: Progressing     Problem: Skin/Tissue Integrity  Goal: Absence of new skin breakdown  Description: 1.  Monitor for areas of redness and/or skin breakdown  2.  Assess vascular access sites hourly  3.  Every 4-6 hours minimum:  Change oxygen saturation probe site  4.  Every 4-6 hours:  If on nasal continuous positive airway pressure, respiratory therapy assess nares and determine need for appliance change or resting period.  12/29/2024 2219 by Kaitlyn Ibarra RN  Outcome: Progressing  12/29/2024 0900 by Miriam Vance RN  Outcome: Progressing     Problem: ABCDS Injury Assessment  Goal: Absence of physical injury  12/29/2024 0900 by Miriam Vance RN  Outcome: Progressing

## 2024-12-30 NOTE — PROGRESS NOTES
Warfarin Dosing - Pharmacy Consult Note  Consulting Provider: Dr. Norman Olivia       Indication:  Mechanical Heart Valve (atrial)     Warfarin Dose prior to admission: Warfarin 5 mg PO tablet daily      Concurrent anticoagulants/antiplatelets: none  Significant Drug Interactions: No obvious interactions    Recent Labs     12/28/24  0325 12/29/24  0114 12/30/24  0516   INR 3.8* 2.5* 2.6*   HGB 11.9*  --  11.7*     --  194     Recent warfarin administrations                     warfarin (COUMADIN) tablet 2 mg (mg) 2 mg Given 12/29/24 1715                   Date        INR         Dose  12/30/24     2.6                    Warfarin 2mg given 12/29/24    Assessment/Plan  (Goal INR: 2 - 3)  Warfarin 2mg ordered  INR currently at 2.6  Will reevaluate based on INR tomorrow    Active problem list reviewed.  INR orders are placed.  Chart reviewed for pertinent labs, drug/diet interactions, and past doses.  Documentation of patient's clinical condition was reviewed.    Pharmacy Dosing:  Pharmacy will continue to follow.

## 2024-12-30 NOTE — CARE COORDINATION
SNF Authorization Request  12/30/2024, 9:57 AM    Patient Name: Ritika Clancy                   YOB: 1940    Patient has been provided with freedom of choice and has chosen to go to [facility]    SNF has confirmed that they can accept the patient and they have a bed Yes  SNF has confirmed that they are in network with the patient's insurance Yes    Please request authorization.    Estimated date of discharge is 12/30    Skilled Need    PT Yes   OT Yes   Speech therapy No   Wound Care No  IV MedicationsNo  Trach No   Peg No     If PT/ OT/ Speech is required, evaluations/ notes have been updated within the last 48 hours Yes       Additional information N/A    Payor: Payor: [facility] MEDICARE / Plan: [facility] MEDICARE COMPLETE / Product Type: *No Product type* /   Attending physician: Lizbeth Hayes MD Morgan Marquez, RN  Case Management Department

## 2024-12-30 NOTE — PROGRESS NOTES
Hospitalist Progress Note    Patient: Ritika Clancy MRN: [id number]  CSN: [id number]    YOB: 1940  Age: 84 y.o.  Sex: female    DOA: 12/24/2024 LOS:  LOS: 6 days         Total duration of encounter: 6 days      Chief Complaint ;    84 y.o. female with COPD, hypertension, aortic valve replacement on Coumadin, cardiomyopathy presents to ER with concerns of shortness of breath and cough that has been progressively getting worse for the past 10 days.   Subjective ;  I did not have bowel movement for several days.  I know it is coming.  Epigastric pain.   RN constipation    Review of systems:  Constitutional: denies fevers, chills, myalgias  Respiratory: denies SOB, cough  Cardiovascular: denies chest pain, palpitations  Gastrointestinal: denies nausea, vomiting, diarrhea.  + Constipation    10 systems reviewed, all negative other than what is mentioned above.      Vital signs/Intake and Output:  Visit Vitals  /80   Pulse 87   Temp 97.8 °F (36.6 °C) (Oral)   Resp 18   Ht 1.626 m (5' 4.02\")   Wt 65.5 kg (144 lb 6.4 oz)   SpO2 95%   BMI 24.77 kg/m²     Current Shift:  No intake/output data recorded.  Last three shifts:  12/28 1901 - 12/30 0700  In: 1240 [P.O.:1240]  Out: 4400 [Urine:4400]    Exam:    General: Well developed, alert, NAD, OX3  Head/Neck: NCAT, supple, No masses, No lymphadenopathy  CVS:Regular rate and rhythm, no M/R/G, S1/S2 heard, no thrill  Lungs:Clear to auscultation bilaterally, no wheezes, rhonchi, or rales  Abdomen: Soft, Nontender, No distention, Normal Bowel sounds, No hepatomegaly  Extremities: No C/C/E, pulses palpable 2+  Skin:normal texture and turgor, no rashes, no lesions  Neuro:grossly normal , follows commands  Psych:appropriate    Labs: Results:       Chemistry Recent Labs     12/30/24  0516   GLUCOSE 95      K 3.2*      CO2 31   BUN 16   CREATININE 0.50*   CALCIUM 9.0   BILITOT 0.9   AST 45*   ALT 67*   ALKPHOS 45   GLOB 3.1   LABGLOM >90      CBC

## 2024-12-30 NOTE — CONSULTS
Palliative Medicine Initial Consult  Patient Name: Ritika Clancy  YOB: 1940  MRN: [id number]  Age: 84 y.o.  Gender: female    Date of Initial Consult: 12/30/2024  Date of Service: 12/30/2024  Time: 3:05 PM  Provider: MARICHUY Sanchez  Hospital Day: 7  Admit Date: 12/24/2024  Referring Provider: Dr. Hayes       Reasons for Consultation:  Goals of Care and Other: code status    HISTORY OF PRESENT ILLNESS (HPI):   Ritika Clancy is a 84 y.o. female with a past medical history of asthma, COPD, cardiomyopathy, history of aortic valve replacement, history of left femur spiral fracture s/p ORIF (2/28/23), who was admitted on 12/24/2024 from rehab via EMS with a diagnosis of acute respiratory failure with hypoxia. Per chart review the patient had been experiencing cough and shortness of breath over the past 10 days. EMS was called and her oxygen saturation on RA was in the 70's and she was placed on NRB mask. Temp in ER was 101.8. CTA chest showed negative PE, RLL and RML mucous plugging. Lab work revealed positive for influenza A. Palliative care was consulted for goals of care and code status discussion.    Psychosocial: Patient lives with son Frank. She has three other children, to include a son in a long term care facility in [place] and a daughter who served in the [employer]. She is from [place] and worked as a CNA for several years, along with several other jobs. She is .     12/30/2024 Patient alert, oriented, pleasant and talkative; on 2L NC, in no acute distress.Denies pain, shortness of breath. No family at bedside.      PALLIATIVE DIAGNOSES:    Encounter for palliative care  Goals of care  Acute respiratory failure with hypoxia  Influenza A  COPD with exacerbation  History of left femur fracture s/p ORIF  History of aortic valve replacement    ASSESSMENT AND PLAN:   Goals of care, counseling/discussion    12/30/2024 Patient seen and examined along with VINICIUS Irizarry. She was  ASSESSMENT:      Palliative Performance Scale (PPS):  PPS: 50    Modified ESAS:  Modified-[place] Symptom Assessment Scale (ESAS)  Pain Score: No pain  Dyspnea Score: No shortness of breath    Clinical Pain Assessment (nonverbal scale for severity on nonverbal patients):   Clinical Pain Assessment  Severity: 0       RDOS:  RDOS  Heart rate per minute:   Respiratory Rate per Minute: less than 19  Restlessness:non-purposeful: None  Paradoxical breathing pattern:abdomen moves in on inspiration: None  Accessory muscle use: rise in clavicle during inspiration: None  Grunting at end-expiration: guttural sound: None  Nasal flaring: involuntary movement of nares: None  Look of fear: None  Total : 1      Vital Signs: Blood pressure 134/80, pulse (!) 104, temperature 97.8 °F (36.6 °C), temperature source Oral, resp. rate 18, height 1.626 m (5' 4.02\"), weight 65.5 kg (144 lb 6.4 oz), SpO2 95%.    PHYSICAL ASSESSMENT:   General: [x] Oriented x3  [] Well appearing  [] Intubated  []Ill appearing  []Other:  Mental Status: [x] Normal mental status exam  [] Drowsy  [] Confused  []Other:  Cardiovascular: [x] Regular rate/rhythm  [] Arrhythmia  [] Other:  Chest: [x] Effort normal  []Lungs clear  [] Respiratory distress  []Tachypnea  [x] Other: on 2L NC  Abdomen: [] Soft/non-tender  [x] Normal appearance  [] Distended  [] Ascites  [] Other:  Neurological: [x] Normal speech  [] Normal sensation  []Deficits present:  Extremity: [x] Normal skin color/temp  [] Clubbing/cyanosis  [x] No edema  [] Other:    Wt Readings from Last 15 Encounters:   12/28/24 65.5 kg (144 lb 6.4 oz)        Current Diet: ADULT DIET; Regular; Low Fat/Low Chol/High Fiber/KATIE  ADULT ORAL NUTRITION SUPPLEMENT; Breakfast; Diabetic Oral Supplement       PSYCHOSOCIAL/SPIRITUAL SCREENING:   Palliative IDT has assessed this patient for cultural preferences / practices and a referral made as appropriate to needs (Cultural Services, Patient Advocacy, Ethics,

## 2024-12-30 NOTE — CARE COORDINATION
CM spoke to patients son who is agreeable to patient discharging to SNF at [facility].

## 2024-12-30 NOTE — ACP (ADVANCE CARE PLANNING)
Advance Care Planning       Palliative Team Advance Care Planning (ACP) Conversation        Date of Conversation: 12/30/24      Individuals present for the conversation: Patient with decision making capacity, Valencia Muniz NP (Palliative) and this writer.       ACP documents on file prior to discussion:  -None      Previously completed document/s not on file: Patient / participant reports that there are no previously executed ACP documents.      Healthcare Decision Maker:     Patient does not have a formal healthcare decision maker document, on file. Patient declined to complete an advance medical directive (AMD), at this time. She wanted to talk with her son and other family members, before she made a final decision. Patient's son (Frank Jean Baptiste, Ph#[phone]) is her main point of contact. Patient has three other children who are no in the area. Two of them are reported to be incapacitated.       Conversation Summary:      This writer, along with Valencia Muniz NP, with the Palliative Care team; visited with patient today to offer support and also address healthcare decision maker(s) and discuss goals of care moving forward. Patient was laying in bed; alert and oriented.     Patient reported that her son (Frank) resides with her. Patient went on to report that she needs assistance with her ADLs and IADLs. She explained that Frank helps with all of her ADLs and IADLs. Patient reported that she has not been able to walk for about a year now. She had a fracture about year ago and she has done a few rehab stays; none of them helped. Patient reported that her main mode of mobility is her wheelchair. Patient reported that she thinks she is going to another rehab facility, when she is discharged. Her ultimate goal is to return home with her son.     Patient does not have a formal healthcare decision maker document, on file. Patient has a total of 4 children; 3 boys and 1 girl. One of her sons is non-verbal and

## 2024-12-31 ENCOUNTER — APPOINTMENT (OUTPATIENT)
Facility: HOSPITAL | Age: 84
End: 2024-12-31
Payer: MEDICARE

## 2024-12-31 LAB
ALBUMIN SERPL-MCNC: 2.6 G/DL (ref 3.4–5)
ALBUMIN/GLOB SERPL: 0.8 (ref 0.8–1.7)
ALP SERPL-CCNC: 48 U/L (ref 45–117)
ALT SERPL-CCNC: 69 U/L (ref 13–56)
ANION GAP SERPL CALC-SCNC: 2 MMOL/L (ref 3–18)
AST SERPL-CCNC: 44 U/L (ref 10–38)
BASOPHILS # BLD: 0 K/UL (ref 0–0.1)
BASOPHILS NFR BLD: 0 % (ref 0–2)
BILIRUB SERPL-MCNC: 1 MG/DL (ref 0.2–1)
BUN SERPL-MCNC: 19 MG/DL (ref 7–18)
BUN/CREAT SERPL: 31 (ref 12–20)
CALCIUM SERPL-MCNC: 9.5 MG/DL (ref 8.5–10.1)
CHLORIDE SERPL-SCNC: 111 MMOL/L (ref 100–111)
CO2 SERPL-SCNC: 30 MMOL/L (ref 21–32)
CREAT SERPL-MCNC: 0.62 MG/DL (ref 0.6–1.3)
DIFFERENTIAL METHOD BLD: ABNORMAL
EOSINOPHIL # BLD: 0 K/UL (ref 0–0.4)
EOSINOPHIL NFR BLD: 0 % (ref 0–5)
ERYTHROCYTE [DISTWIDTH] IN BLOOD BY AUTOMATED COUNT: 12 % (ref 11.6–14.5)
GLOBULIN SER CALC-MCNC: 3.2 G/DL (ref 2–4)
GLUCOSE SERPL-MCNC: 103 MG/DL (ref 74–99)
HCT VFR BLD AUTO: 36.9 % (ref 35–45)
HGB BLD-MCNC: 12.1 G/DL (ref 12–16)
IMM GRANULOCYTES # BLD AUTO: 0.1 K/UL (ref 0–0.04)
IMM GRANULOCYTES NFR BLD AUTO: 1 % (ref 0–0.5)
INR PPP: 2 (ref 0.9–1.1)
LYMPHOCYTES # BLD: 1.3 K/UL (ref 0.9–3.6)
LYMPHOCYTES NFR BLD: 13 % (ref 21–52)
MCH RBC QN AUTO: 31.9 PG (ref 24–34)
MCHC RBC AUTO-ENTMCNC: 32.8 G/DL (ref 31–37)
MCV RBC AUTO: 97.4 FL (ref 78–100)
MONOCYTES # BLD: 0.9 K/UL (ref 0.05–1.2)
MONOCYTES NFR BLD: 10 % (ref 3–10)
NEUTS SEG # BLD: 7.3 K/UL (ref 1.8–8)
NEUTS SEG NFR BLD: 76 % (ref 40–73)
NRBC # BLD: 0.02 K/UL (ref 0–0.01)
NRBC BLD-RTO: 0.2 PER 100 WBC
PLATELET # BLD AUTO: 224 K/UL (ref 135–420)
PMV BLD AUTO: 10.2 FL (ref 9.2–11.8)
POTASSIUM SERPL-SCNC: 4.9 MMOL/L (ref 3.5–5.5)
PROT SERPL-MCNC: 5.8 G/DL (ref 6.4–8.2)
PROTHROMBIN TIME: 23.2 SEC (ref 11.9–14.9)
RBC # BLD AUTO: 3.79 M/UL (ref 4.2–5.3)
SODIUM SERPL-SCNC: 143 MMOL/L (ref 136–145)
WBC # BLD AUTO: 9.6 K/UL (ref 4.6–13.2)

## 2024-12-31 PROCEDURE — 6370000000 HC RX 637 (ALT 250 FOR IP): Performed by: HOSPITALIST

## 2024-12-31 PROCEDURE — 97162 PT EVAL MOD COMPLEX 30 MIN: CPT

## 2024-12-31 PROCEDURE — 85610 PROTHROMBIN TIME: CPT

## 2024-12-31 PROCEDURE — 97530 THERAPEUTIC ACTIVITIES: CPT

## 2024-12-31 PROCEDURE — 99233 SBSQ HOSP IP/OBS HIGH 50: CPT

## 2024-12-31 PROCEDURE — 6360000002 HC RX W HCPCS: Performed by: HOSPITALIST

## 2024-12-31 PROCEDURE — 97165 OT EVAL LOW COMPLEX 30 MIN: CPT

## 2024-12-31 PROCEDURE — 36415 COLL VENOUS BLD VENIPUNCTURE: CPT

## 2024-12-31 PROCEDURE — 94669 MECHANICAL CHEST WALL OSCILL: CPT

## 2024-12-31 PROCEDURE — 2500000003 HC RX 250 WO HCPCS: Performed by: HOSPITALIST

## 2024-12-31 PROCEDURE — 2500000003 HC RX 250 WO HCPCS: Performed by: EMERGENCY MEDICINE

## 2024-12-31 PROCEDURE — 85025 COMPLETE CBC W/AUTO DIFF WBC: CPT

## 2024-12-31 PROCEDURE — 71045 X-RAY EXAM CHEST 1 VIEW: CPT

## 2024-12-31 PROCEDURE — 1100000003 HC PRIVATE W/ TELEMETRY

## 2024-12-31 PROCEDURE — 2700000000 HC OXYGEN THERAPY PER DAY

## 2024-12-31 PROCEDURE — 6370000000 HC RX 637 (ALT 250 FOR IP): Performed by: INTERNAL MEDICINE

## 2024-12-31 PROCEDURE — 80053 COMPREHEN METABOLIC PANEL: CPT

## 2024-12-31 PROCEDURE — 94640 AIRWAY INHALATION TREATMENT: CPT

## 2024-12-31 RX ORDER — WARFARIN SODIUM 2.5 MG/1
2.5 TABLET ORAL
Status: COMPLETED | OUTPATIENT
Start: 2024-12-31 | End: 2024-12-31

## 2024-12-31 RX ADMIN — SODIUM CHLORIDE, PRESERVATIVE FREE 10 ML: 5 INJECTION INTRAVENOUS at 20:12

## 2024-12-31 RX ADMIN — ATORVASTATIN CALCIUM 40 MG: 20 TABLET, FILM COATED ORAL at 08:32

## 2024-12-31 RX ADMIN — BUDESONIDE 500 MCG: 0.5 INHALANT RESPIRATORY (INHALATION) at 20:44

## 2024-12-31 RX ADMIN — ARFORMOTEROL TARTRATE 15 MCG: 15 SOLUTION RESPIRATORY (INHALATION) at 07:35

## 2024-12-31 RX ADMIN — WARFARIN SODIUM 2.5 MG: 2.5 TABLET ORAL at 17:15

## 2024-12-31 RX ADMIN — ACETAMINOPHEN 650 MG: 325 TABLET ORAL at 08:41

## 2024-12-31 RX ADMIN — SODIUM CHLORIDE, PRESERVATIVE FREE 10 ML: 5 INJECTION INTRAVENOUS at 08:32

## 2024-12-31 RX ADMIN — IPRATROPIUM BROMIDE AND ALBUTEROL SULFATE 1 DOSE: .5; 2.5 SOLUTION RESPIRATORY (INHALATION) at 11:30

## 2024-12-31 RX ADMIN — PANTOPRAZOLE SODIUM 40 MG: 40 TABLET, DELAYED RELEASE ORAL at 06:16

## 2024-12-31 RX ADMIN — IPRATROPIUM BROMIDE AND ALBUTEROL SULFATE 1 DOSE: .5; 2.5 SOLUTION RESPIRATORY (INHALATION) at 07:34

## 2024-12-31 RX ADMIN — PREDNISONE 20 MG: 20 TABLET ORAL at 08:32

## 2024-12-31 RX ADMIN — BUDESONIDE 500 MCG: 0.5 INHALANT RESPIRATORY (INHALATION) at 07:34

## 2024-12-31 RX ADMIN — ARFORMOTEROL TARTRATE 15 MCG: 15 SOLUTION RESPIRATORY (INHALATION) at 20:44

## 2024-12-31 RX ADMIN — IPRATROPIUM BROMIDE AND ALBUTEROL SULFATE 1 DOSE: .5; 2.5 SOLUTION RESPIRATORY (INHALATION) at 20:44

## 2024-12-31 NOTE — PROGRESS NOTES
Hospitalist Progress Note    Patient: Ritika Clancy MRN: [id number]  CSN: [id number]    YOB: 1940  Age: 84 y.o.  Sex: female    DOA: 12/24/2024 LOS:  LOS: 7 days         Total duration of encounter: 7 days      Chief Complaint ;    84 y.o. female with COPD, hypertension, aortic valve replacement on Coumadin, cardiomyopathy presents to ER with concerns of shortness of breath and cough that has been progressively getting worse for the past 10 days.   Subjective ;  I feel better today, I got BM . My breathing still short . Encourage pt continue to use incentive spirometer  RN stable     Review of systems:  Constitutional: denies fevers, chills, myalgias  Respiratory: denies SOB, cough  Cardiovascular: denies chest pain, palpitations  Gastrointestinal: denies nausea, vomiting, diarrhea.      10 systems reviewed, all negative other than what is mentioned above.      Vital signs/Intake and Output:  Visit Vitals  /89   Pulse 93   Temp 97.3 °F (36.3 °C) (Oral)   Resp 18   Ht 1.626 m (5' 4.02\")   Wt 65.5 kg (144 lb 6.4 oz)   SpO2 96%   BMI 24.77 kg/m²     Current Shift:  12/31 0701 - 12/31 1900  In: 240 [P.O.:240]  Out: -   Last three shifts:  12/29 1901 - 12/31 0700  In: 860 [P.O.:860]  Out: 1850 [Urine:1850]    Exam:    General: Well developed, alert, NAD, OX3  Head/Neck: NCAT, supple, No masses, No lymphadenopathy  CVS:Regular rate and rhythm, no M/R/G, S1/S2 heard, no thrill  Lungs:Clear to auscultation bilaterally, no wheezes, rhonchi, or rales  Abdomen: Soft, Nontender, No distention, Normal Bowel sounds, No hepatomegaly  Extremities: No C/C/E, pulses palpable 2+  Skin:normal texture and turgor, no rashes, no lesions  Neuro:grossly normal , follows commands  Psych:appropriate    Labs: Results:       Chemistry Recent Labs     12/30/24  0516 12/31/24  0450   GLUCOSE 95 103*    143   K 3.2* 4.9    111   CO2 31 30   BUN 16 19*   CREATININE 0.50* 0.62   CALCIUM 9.0 9.5   BILITOT 0.9 1.0    AST 45* 44*   ALT 67* 69*   ALKPHOS 45 48   GLOB 3.1 3.2   LABGLOM >90 88      CBC w/Diff Recent Labs     12/30/24  0516 12/31/24  0450   WBC 5.8 9.6   RBC 3.62* 3.79*   HGB 11.7* 12.1   HCT 35.0 36.9    224   LYMPHOPCT 24 13*      Cardiac Enzymes No results for input(s): \"CKTOTAL\", \"CKMB\", \"CKMBINDEX\", \"TROPONINI\" in the last 72 hours.    Invalid input(s): \"CORBY\"   Coagulation Recent Labs     12/30/24  0516 12/31/24  0450   PROTIME 27.8* 23.2*   INR 2.6* 2.0*       Lipid Panel No results found for: \"CHOL\", \"TRIG\", \"HDL\", \"VLDL\", \"CHOLHDLRATIO\"   BNP No results for input(s): \"BNP\" in the last 72 hours.   Liver Enzymes Recent Labs     12/31/24  0450   ALT 69*   AST 44*   ALKPHOS 48   BILITOT 1.0      Thyroid Studies No results found for: \"Q1MZMBR\", \"FT3\", \"T4FREE\", \"TSH\"       Procedures/imaging: see electronic medical records for all procedures/Xrays and details which were not copied into this note but were reviewed prior to creation of Plan         Assessment/Plan     Principal Problem:    Acute respiratory failure with hypoxia  Active Problems:    Acute respiratory failure with hypoxemia    COPD with acute exacerbation (HCC)    Primary hypertension    Status post mechanical aortic valve replacement    Cardiomyopathy (HCC)    Encounter for palliative care    Goals of care, counseling/discussion    Influenza A    History of femur fracture  Resolved Problems:    * No resolved hospital problems. *       Acute respiratory failure with hypoxia  On oxygen by nasal cannula, wean as tolerated  CTA chest no PE, showed mucous plugging right middle lobe and right lower lobe with right middle lobe airspace opacity likely volume loss.  on Solu-Medrol-now prednisone -weaning off oxygen as tolerate    Brovana and Pulmicort.  DuoNeb as needed  Incentive spirometry.  PCCM following  Up 3.5 L will repeat cxr            influenza A  Out of window for Tamiflu  Continue monitoring for complication from fluid infection  Received

## 2024-12-31 NOTE — ACP (ADVANCE CARE PLANNING)
Advance Care Planning       Palliative Team Advance Care Planning (ACP) Conversation        Date of Conversation: 12/31/24      Individuals present for the conversation: Patient with decision making capacity, patient's son (Frank Jean Baptiste) via phone, Valencia Muniz NP (Palliative) and this writer.       Healthcare Decision Maker:    Patient did not have a formal healthcare decision maker document, on file. A new advance medical directive (AMD) was completed; naming her son (Frank Jean Baptiste) as her primary decision maker and her daughter (Mehnaz Mckeon) as her secondary decision maker. A copy of the AMD has now been scanned in to the EMR.      Primary Decision Maker: Frank Jean Baptiste (Son) - Child - [phone]    Secondary Decision Maker: Mehnaz Mckeon (Daughter) - Child - [phone]       Conversation Summary:      This writer, along with Valencia Muniz NP, with the Palliative Care team; visited with patient today to offer support support and also follow up with advance medical directive (AMD) completion and re-address goals of care. Patient was laying in bed; alert and oriented. Patient was asked about her healthcare decision maker(s). Patient has consistently reported that she wants her son (Frank) as her primary decision maker and her daughter (Mehnaz) as her secondary decision maker.     A new AMD was completed and now scanned in to the EMR. Goals of care were then re-addressed. Patient has been very clear that she does not want any attempts at resuscitation, in the event her heart and breathing were to stop. Patient wants to get her son (Frank) on the phone. Frank was called and participated in the conversation moving forward. Frank is on board with patient being a do not resuscitate.     A DDNR was completed with patient and now a copy has been scanned in to the EMR. At this time, patient is a DNR/DNI.      Resuscitation Status:   Code Status: DNR/DNI       Documentation Completed:  -Portable

## 2024-12-31 NOTE — PROGRESS NOTES
Warfarin Dosing - Pharmacy Consult Note    Consulting Provider: Dr. Olivia      Indication:  Mechanical Heart Valve (atrial)    Warfarin Dose prior to admission: 5 mg Daily     Recent Labs     12/29/24  0114 12/30/24  0516 12/31/24  0450   INR 2.5* 2.6* 2.0*   HGB  --  11.7* 12.1   PLT  --  194 224     Recent warfarin administrations                     warfarin (COUMADIN) tablet 2 mg (mg) 2 mg Given 12/30/24 2043    warfarin (COUMADIN) tablet 2 mg (mg) 2 mg Given 12/29/24 1715                  Assessment/Plan  (Goal INR: 2 - 3)      Warfarin 2.5 mg today at 18:00    Active problem list reviewed.  INR orders are placed.  Chart reviewed for pertinent labs, drug/diet interactions, and past doses.  Documentation of patient's clinical condition was reviewed.    Pharmacy Dosing:  Pharmacy will continue to follow.

## 2024-12-31 NOTE — PROGRESS NOTES
Physical Therapy Goals:  Initiated 12/31/2024 to be met within 7-10 days.  Short Term Goals  Short Term Goal 1: Patient will perform supine to sit with moderate assist.  Short Term Goal 2: Patient will demonstrate Fair static sitting balance EOB with UE support for ADL activity.      PHYSICAL THERAPY EVALUATION    Patient: Ritika Clancy (84 y.o. female)  Date: 12/31/2024  Primary Diagnosis: Acute respiratory failure with hypoxia [J96.01]  Precautions: Fall Risk, Droplet  PLOF: Lives with son who is pt caregiver in ground level apartment.  Reports inability to use L LE since femoral fracture.  Is able to roll toward L with assist;  but not toward R due to L LE deficits.  States son assist with transfers bed to w/c via sliding board.    ASSESSMENT :  PT introduced self to pt, explained role and pt agreeable to participate with session. Based on the objective data described below, the patient presents with limitations in ROM and motor performance BLE's as noted below (note L LE non-functional and bilat LL's with muscle atrophy.  Pt also with impaired bed mobility, transfers, decreased sitting balance and decreased activity tolerance impacting function.  Patient seen on telemetry unit, found reclined in bed and reporting no pain t/o session. Pt with O2 and [place] in place.  Pt initially c/o feeling frustrated due to joel many people in and out off room last night, as well as due to having to answer so many questions.  Attempt made to encourage pt.  Pt requires max/total assist supine to sit EOB.  Static sitting balance poor with constant support required.  Pt fatigued after ~5 min and returned to supine total assist.  Scooting up requires total assist with bed in trendelenburg position.  Pt left reclined in bed with all needs in reach, lines intact and nurse Lucero notified of session outcome.   Pt may benefit from continued IPPT to address deficits and achieve goals above in order to resume PLOF as PTA/current  Nurse notified, See doc flow    Activity Tolerance:   Activity Tolerance: Patient tolerated evaluation without incident;Patient tolerated treatment well    Please refer to the flowsheet for vital signs taken during this treatment.    After treatment:   []         Patient left in no apparent distress sitting up in chair  [x]         Patient left in no apparent distress in bed  [x]         Call bell left within reach  []         Nursing notified  []         Caregiver present  []         Bed alarm activated  []         SCDs applied    COMMUNICATION/EDUCATION:   Patient Education  Education Given To: Patient  Education Provided: Role of Therapy;Plan of Care;Mobility Training  Education Method: Verbal  Barriers to Learning: None  Education Outcome: Verbalized understanding;Continued education needed    Thank you for this referral.  [place], PT  Minutes: 35      Eval Complexity: Decision Making: Medium Complexity

## 2024-12-31 NOTE — PROGRESS NOTES
from [facility] requested more clinical documentation for Authorization request. CMS sent off additional documentation and now awaiting Authorization decision.

## 2024-12-31 NOTE — PLAN OF CARE
Problem: Discharge Planning  Goal: Discharge to home or other facility with appropriate resources  12/31/2024 1108 by Lucero Coleman RN  Outcome: Progressing  Flowsheets (Taken 12/31/2024 0830)  Discharge to home or other facility with appropriate resources:   Identify barriers to discharge with patient and caregiver   Arrange for needed discharge resources and transportation as appropriate   Identify discharge learning needs (meds, wound care, etc)  12/30/2024 2331 by Kaitlyn Ibarra RN  Outcome: Progressing     Problem: Safety - Adult  Goal: Free from fall injury  12/31/2024 1108 by Lucero Coleman RN  Outcome: Progressing  12/30/2024 2331 by Kaitlyn Ibarra RN  Outcome: Progressing     Problem: Chronic Conditions and Co-morbidities  Goal: Patient's chronic conditions and co-morbidity symptoms are monitored and maintained or improved  12/31/2024 1108 by Lucero Coleman RN  Outcome: Progressing  Flowsheets (Taken 12/31/2024 0830)  Care Plan - Patient's Chronic Conditions and Co-Morbidity Symptoms are Monitored and Maintained or Improved:   Monitor and assess patient's chronic conditions and comorbid symptoms for stability, deterioration, or improvement   Collaborate with multidisciplinary team to address chronic and comorbid conditions and prevent exacerbation or deterioration   Update acute care plan with appropriate goals if chronic or comorbid symptoms are exacerbated and prevent overall improvement and discharge  12/30/2024 2331 by Kaitlyn Ibarra RN  Outcome: Progressing     Problem: Skin/Tissue Integrity  Goal: Absence of new skin breakdown  Description: 1.  Monitor for areas of redness and/or skin breakdown  2.  Assess vascular access sites hourly  3.  Every 4-6 hours minimum:  Change oxygen saturation probe site  4.  Every 4-6 hours:  If on nasal continuous positive airway pressure, respiratory therapy assess nares and determine need for appliance change or resting period.  12/31/2024 1108 by Jared  Lucero BOWIE RN  Outcome: Progressing  12/30/2024 2331 by Kaitlyn Ibarra RN  Outcome: Progressing     Problem: ABCDS Injury Assessment  Goal: Absence of physical injury  Outcome: Progressing     Problem: Pain  Goal: Verbalizes/displays adequate comfort level or baseline comfort level  12/31/2024 1108 by Lucero Coleman RN  Outcome: Progressing  12/30/2024 2331 by Kaitlyn Ibarra RN  Outcome: Progressing

## 2024-12-31 NOTE — PROGRESS NOTES
Palliative Medicine Progress Note  Patient Name: Ritika Clancy  YOB: 1940  MRN: [id number]  Age: 84 y.o.  Gender: female    Date of Initial Consult: 12/30/2024  Date of Service: 12/31/2024  Time: 1:49 PM  Provider: MARICHUY Sanchez  Hospital Day: 8  Admit Date: 12/24/2024  Referring Provider: Dr. Hayes       Reasons for Consultation:  Goals of Care and Other: code status    HISTORY OF PRESENT ILLNESS (HPI):   Ritika Clancy is a 84 y.o. female with a past medical history of asthma, COPD, cardiomyopathy, history of aortic valve replacement, history of left femur spiral fracture s/p ORIF (2/28/23), who was admitted on 12/24/2024 from rehab via EMS with a diagnosis of acute respiratory failure with hypoxia. Per chart review the patient had been experiencing cough and shortness of breath over the past 10 days. EMS was called and her oxygen saturation on RA was in the 70's and she was placed on NRB mask. Temp in ER was 101.8. CTA chest showed negative PE, RLL and RML mucous plugging. Lab work revealed positive for influenza A. Palliative care was consulted for goals of care and code status discussion.    Psychosocial: Patient lives with son Frank. She has three other children, to include a son in a long term care facility in [place] and a daughter who served in the [employer]. She is from [place] and worked as a CNA for several years, along with several other jobs. She is .     12/31/2024 Patient alert, looks tired, oriented x3. Receiving breathing treatment. Denies pain or shortness of breath. Had a BM this am and feels better. No family at bedside.    12/30/2024 Patient alert, oriented, pleasant and talkative; on 2L NC, in no acute distress.Denies pain, shortness of breath. No family at bedside.      PALLIATIVE DIAGNOSES:    Encounter for palliative care  Goals of care  Acute respiratory failure with hypoxia  Influenza A  COPD with exacerbation  History of left femur fracture s/p  AND IMAGING FINDINGS:   Objective data reviewed:  labs, images, records, medication use, vitals, and chart     FINAL COMMENTS   Thank you for allowing Palliative Medicine to participate in the care of Ritika Clancy.    Only check if applicable and billing time based rather than MDM  [x] The total encounter time on this service date was _50__ minutes which was spent performing a face-to-face encounter and personally completing the provider-level activities documented in the note. This includes time spent prior to the visit and after the visit in direct care of the patient. This time does not include time spent in any separately reportable services.    Electronically signed by   Valencia Muniz, [facility]  Palliative Care Team  on 12/31/2024 at 1:49 PM

## 2024-12-31 NOTE — PROGRESS NOTES
Case management specialist met with patient to review and go over Important Message from Medicare letter in which patient acknowledged understanding and signed the letter. A copy of letter was left in room with patient.

## 2024-12-31 NOTE — PROGRESS NOTES
OCCUPATIONAL THERAPY EVALUATION/DISCHARGE    Patient: Ritika Clancy (84 y.o. female)  Date of initial service: 12/31/2024  Date of final service: 12/31/2024  Number of sessions completed: 1  Primary Diagnosis: Acute respiratory failure with hypoxia [J96.01]       Precautions: Fall Risk,  ,  ,  ,  ,  ,  ,               PLOF: Patient reports she completes grooming at bed level with setup A, for toileting she uses briefs and chucks in the bed and then her son changes them- she does not transfer to potty chair because she does not know when she will need to use the bathroom, her son helps her put on clothes and she uses a button hook if needed. Patient reports she can feed herself. Patient sons help her transfer to w/c via sliding board as needed, patient reports spending most of her time is spent sitting up in bed watching tv. Patient son does a bed bath with patient.     ASSESSMENT AND RECOMMENDATIONS:  Patient presented supine in bed with O2 via NC. Patient with no visitors present for entire session. Patient completed assessment of ADLs and BUE strength, ROM (see details below). Patient completed grooming at bed level with setup A. Patient demonstrated rolling. Patient appears to be functioning at baseline level. Based on patient report, her son has a high caregiver burden and may benefit from respite caregiver services or a bathing aide. Patient left with CNA for dependent bed bath. No Skilled OT: At baseline function   Maximum therapeutic gains met at current level of care and patient will be discharged from occupational therapy at this time. If patient family unable to continue to care for patient full time, patient will need LTC.     Further Equipment Recommendations for Discharge:  ,          [facility]: AM-PAC Inpatient Daily Activity Raw Score: 13/24 -Discharge Recommendations: Long Term Care without OT   Current research shows that an AM-PAC score of 17 or less is not associated with a discharge to the  patient's home setting.  Based on an AM-PAC score and their current ADL deficits; it is recommended that the patient have 3-5 sessions per week of Occupational Therapy at d/c to increase the patient's independence.       This AMPAC score should be considered in conjunction with interdisciplinary team recommendations to determine the most appropriate discharge setting. Patient's social support, diagnosis, medical stability, and prior level of function should also be taken into consideration.     SUBJECTIVE:   Patient stated “I'm fine”    OBJECTIVE DATA SUMMARY:     Past Medical History:   Diagnosis Date    Asthma     Cardiomyopathy ([facility])     COPD (chronic obstructive pulmonary disease) ([facility])     H/O aortic valve replacement    History reviewed. No pertinent surgical history.  Barriers to Learning/Limitations: sensory deficits-vision/hearing/speech and physical  Compensate with: visual, verbal, tactile, kinesthetic cues/model    Home Situation:   Social/Functional History  Lives With: Son  Type of Home: Apartment  Home Layout: One level  Home Access: Level entry  Bathroom Shower/Tub: Tub/Shower unit  Bathroom Toilet: Standard  Bathroom Equipment: None  Bathroom Accessibility: Accessible  Home Equipment: Wheelchair - Manual, Sliding Board, Adjustable bed (sliding board)  Receives Help From: Family  Prior Level of Assist for ADLs: Needs assistance  Bath: Moderate assistance  Dressing: Moderate assistance  Grooming: Modified independent   Feeding: Independent  Toileting: Needs assistance  Prior Level of Assist for Homemaking: Needs assistance  Meal Prep: Unable to assess (comment)  Laundry: Unable to assess (comment)  Vacuuming: Unable to assess (comment)  Cleaning: Unable to assess (comment)  Gardening: Unable to assess (comment)  Yard Work: Unable to assess (comment)  Driving: Unable to assess (comment)  Shopping: Unable to assess (comment)  : Unable to assess (comment)  Other (Comment): Unable to assess

## 2025-01-01 LAB
ALBUMIN SERPL-MCNC: 2.8 G/DL (ref 3.4–5)
ALBUMIN/GLOB SERPL: 0.9 (ref 0.8–1.7)
ALP SERPL-CCNC: 52 U/L (ref 45–117)
ALT SERPL-CCNC: 70 U/L (ref 13–56)
ANION GAP SERPL CALC-SCNC: 3 MMOL/L (ref 3–18)
AST SERPL-CCNC: 38 U/L (ref 10–38)
BASOPHILS # BLD: 0 K/UL (ref 0–0.1)
BASOPHILS NFR BLD: 0 % (ref 0–2)
BILIRUB SERPL-MCNC: 0.8 MG/DL (ref 0.2–1)
BUN SERPL-MCNC: 23 MG/DL (ref 7–18)
BUN/CREAT SERPL: 37 (ref 12–20)
CALCIUM SERPL-MCNC: 9 MG/DL (ref 8.5–10.1)
CHLORIDE SERPL-SCNC: 110 MMOL/L (ref 100–111)
CO2 SERPL-SCNC: 31 MMOL/L (ref 21–32)
CREAT SERPL-MCNC: 0.62 MG/DL (ref 0.6–1.3)
DIFFERENTIAL METHOD BLD: ABNORMAL
EOSINOPHIL # BLD: 0 K/UL (ref 0–0.4)
EOSINOPHIL NFR BLD: 0 % (ref 0–5)
ERYTHROCYTE [DISTWIDTH] IN BLOOD BY AUTOMATED COUNT: 12.3 % (ref 11.6–14.5)
GLOBULIN SER CALC-MCNC: 3 G/DL (ref 2–4)
GLUCOSE SERPL-MCNC: 105 MG/DL (ref 74–99)
HCT VFR BLD AUTO: 37.8 % (ref 35–45)
HGB BLD-MCNC: 12.2 G/DL (ref 12–16)
IMM GRANULOCYTES # BLD AUTO: 0.1 K/UL (ref 0–0.04)
IMM GRANULOCYTES NFR BLD AUTO: 1 % (ref 0–0.5)
INR PPP: 2.1 (ref 0.9–1.1)
LYMPHOCYTES # BLD: 1.4 K/UL (ref 0.9–3.6)
LYMPHOCYTES NFR BLD: 17 % (ref 21–52)
MCH RBC QN AUTO: 32 PG (ref 24–34)
MCHC RBC AUTO-ENTMCNC: 32.3 G/DL (ref 31–37)
MCV RBC AUTO: 99.2 FL (ref 78–100)
MONOCYTES # BLD: 0.8 K/UL (ref 0.05–1.2)
MONOCYTES NFR BLD: 10 % (ref 3–10)
NEUTS SEG # BLD: 6 K/UL (ref 1.8–8)
NEUTS SEG NFR BLD: 72 % (ref 40–73)
NRBC # BLD: 0 K/UL (ref 0–0.01)
NRBC BLD-RTO: 0 PER 100 WBC
PLATELET # BLD AUTO: 235 K/UL (ref 135–420)
PMV BLD AUTO: 10.5 FL (ref 9.2–11.8)
POTASSIUM SERPL-SCNC: 4.4 MMOL/L (ref 3.5–5.5)
PROT SERPL-MCNC: 5.8 G/DL (ref 6.4–8.2)
PROTHROMBIN TIME: 23.4 SEC (ref 11.9–14.9)
RBC # BLD AUTO: 3.81 M/UL (ref 4.2–5.3)
SODIUM SERPL-SCNC: 144 MMOL/L (ref 136–145)
WBC # BLD AUTO: 8.3 K/UL (ref 4.6–13.2)

## 2025-01-01 PROCEDURE — 94640 AIRWAY INHALATION TREATMENT: CPT

## 2025-01-01 PROCEDURE — 85025 COMPLETE CBC W/AUTO DIFF WBC: CPT

## 2025-01-01 PROCEDURE — 80053 COMPREHEN METABOLIC PANEL: CPT

## 2025-01-01 PROCEDURE — 97110 THERAPEUTIC EXERCISES: CPT

## 2025-01-01 PROCEDURE — 6360000002 HC RX W HCPCS: Performed by: HOSPITALIST

## 2025-01-01 PROCEDURE — 6370000000 HC RX 637 (ALT 250 FOR IP): Performed by: HOSPITALIST

## 2025-01-01 PROCEDURE — 6370000000 HC RX 637 (ALT 250 FOR IP): Performed by: INTERNAL MEDICINE

## 2025-01-01 PROCEDURE — 1100000003 HC PRIVATE W/ TELEMETRY

## 2025-01-01 PROCEDURE — 36415 COLL VENOUS BLD VENIPUNCTURE: CPT

## 2025-01-01 PROCEDURE — 85610 PROTHROMBIN TIME: CPT

## 2025-01-01 PROCEDURE — 2700000000 HC OXYGEN THERAPY PER DAY

## 2025-01-01 PROCEDURE — 2500000003 HC RX 250 WO HCPCS: Performed by: HOSPITALIST

## 2025-01-01 RX ORDER — WARFARIN SODIUM 3 MG/1
3 TABLET ORAL
Status: COMPLETED | OUTPATIENT
Start: 2025-01-01 | End: 2025-01-01

## 2025-01-01 RX ADMIN — IPRATROPIUM BROMIDE AND ALBUTEROL SULFATE 1 DOSE: .5; 2.5 SOLUTION RESPIRATORY (INHALATION) at 11:33

## 2025-01-01 RX ADMIN — IPRATROPIUM BROMIDE AND ALBUTEROL SULFATE 1 DOSE: .5; 2.5 SOLUTION RESPIRATORY (INHALATION) at 19:26

## 2025-01-01 RX ADMIN — ARFORMOTEROL TARTRATE 15 MCG: 15 SOLUTION RESPIRATORY (INHALATION) at 19:26

## 2025-01-01 RX ADMIN — IPRATROPIUM BROMIDE AND ALBUTEROL SULFATE 1 DOSE: .5; 2.5 SOLUTION RESPIRATORY (INHALATION) at 07:30

## 2025-01-01 RX ADMIN — IPRATROPIUM BROMIDE AND ALBUTEROL SULFATE 1 DOSE: .5; 2.5 SOLUTION RESPIRATORY (INHALATION) at 15:23

## 2025-01-01 RX ADMIN — WARFARIN SODIUM 3 MG: 3 TABLET ORAL at 17:07

## 2025-01-01 RX ADMIN — ATORVASTATIN CALCIUM 40 MG: 20 TABLET, FILM COATED ORAL at 08:22

## 2025-01-01 RX ADMIN — ARFORMOTEROL TARTRATE 15 MCG: 15 SOLUTION RESPIRATORY (INHALATION) at 07:31

## 2025-01-01 RX ADMIN — SODIUM CHLORIDE, PRESERVATIVE FREE 10 ML: 5 INJECTION INTRAVENOUS at 08:22

## 2025-01-01 RX ADMIN — PREDNISONE 20 MG: 20 TABLET ORAL at 08:22

## 2025-01-01 RX ADMIN — BUDESONIDE 500 MCG: 0.5 INHALANT RESPIRATORY (INHALATION) at 19:26

## 2025-01-01 RX ADMIN — SODIUM CHLORIDE, PRESERVATIVE FREE 10 ML: 5 INJECTION INTRAVENOUS at 20:20

## 2025-01-01 RX ADMIN — PANTOPRAZOLE SODIUM 40 MG: 40 TABLET, DELAYED RELEASE ORAL at 06:14

## 2025-01-01 RX ADMIN — BUDESONIDE 500 MCG: 0.5 INHALANT RESPIRATORY (INHALATION) at 07:31

## 2025-01-01 NOTE — PROGRESS NOTES
Warfarin Dosing - Pharmacy Consult Note      Consulting Provider: Dr. Olivia       Indication:  Mechanical Heart Valve (atrial)     Warfarin Dose prior to admission: 5 mg Daily     Significant Drug Interactions: No obvious interactions      Recent Labs     12/30/24  0516 12/31/24  0450 01/01/25  0204   INR 2.6* 2.0* 2.1*   HGB 11.7* 12.1 12.2    224 235     Recent warfarin administrations                     warfarin (COUMADIN) tablet 2.5 mg (mg) 2.5 mg Given 12/31/24 1715    warfarin (COUMADIN) tablet 2 mg (mg) 2 mg Given 12/30/24 2043    warfarin (COUMADIN) tablet 2 mg (mg) 2 mg Given 12/29/24 1715                     Assessment/Plan  (Goal INR: 2 - 3)  Warfarin 3mg ordered  INR currently at 2.1  Will reevaluate based on INR tomorrow    Active problem list reviewed.  INR orders are placed.  Chart reviewed for pertinent labs, drug/diet interactions, and past doses.  Documentation of patient's clinical condition was reviewed.    Pharmacy Dosing:  Pharmacy will continue to follow.

## 2025-01-01 NOTE — PROGRESS NOTES
Hospitalist Progress Note    Patient: Ritika Clancy MRN: [id number]  CSN: [id number]    YOB: 1940  Age: 84 y.o.  Sex: female    DOA: 12/24/2024 LOS:  LOS: 8 days         Total duration of encounter: 8 days      Chief Complaint ;    84 y.o. female with COPD, hypertension, aortic valve replacement on Coumadin, cardiomyopathy presents to ER with concerns of shortness of breath and cough that has been progressively getting worse for the past 10 days.   Subjective ;  I feel better today. I am ok to go to rehab     Rn stable   Need placement      Review of systems:  Constitutional: denies fevers, chills, myalgias  Respiratory: denies SOB, cough  Cardiovascular: denies chest pain, palpitations  Gastrointestinal: denies nausea, vomiting, diarrhea.      10 systems reviewed, all negative other than what is mentioned above.      Vital signs/Intake and Output:  Visit Vitals  /71   Pulse 51   Temp 97.7 °F (36.5 °C) (Axillary)   Resp 16   Ht 1.626 m (5' 4.02\")   Wt 65.5 kg (144 lb 6.4 oz)   SpO2 100%   BMI 24.77 kg/m²     Current Shift:  01/01 0701 - 01/01 1900  In: 480 [P.O.:480]  Out: -   Last three shifts:  12/30 1901 - 01/01 0700  In: 1500 [P.O.:1500]  Out: 1350 [Urine:1350]    Exam:    General: Well developed, alert, NAD, OX3  Head/Neck: NCAT, supple, No masses, No lymphadenopathy  CVS:Regular rate and rhythm, no M/R/G, S1/S2 heard, no thrill  Lungs:Clear to auscultation bilaterally, no wheezes, rhonchi, or rales  Abdomen: Soft, Nontender, No distention, Normal Bowel sounds, No hepatomegaly  Extremities: No C/C/E, pulses palpable 2+  Skin:normal texture and turgor, no rashes, no lesions  Neuro:grossly normal , follows commands  Psych:appropriate    Labs: Results:       Chemistry Recent Labs     12/30/24  0516 12/31/24  0450 01/01/25  0204   GLUCOSE 95 103* 105*    143 144   K 3.2* 4.9 4.4    111 110   CO2 31 30 31   BUN 16 19* 23*   CREATININE 0.50* 0.62 0.62   CALCIUM 9.0 9.5 9.0    BILITOT 0.9 1.0 0.8   AST 45* 44* 38   ALT 67* 69* 70*   ALKPHOS 45 48 52   GLOB 3.1 3.2 3.0   LABGLOM >90 88 88      CBC w/Diff Recent Labs     12/30/24  0516 12/31/24  0450 01/01/25  0204   WBC 5.8 9.6 8.3   RBC 3.62* 3.79* 3.81*   HGB 11.7* 12.1 12.2   HCT 35.0 36.9 37.8    224 235   LYMPHOPCT 24 13* 17*      Cardiac Enzymes No results for input(s): \"CKTOTAL\", \"CKMB\", \"CKMBINDEX\", \"TROPONINI\" in the last 72 hours.    Invalid input(s): \"CORBY\"   Coagulation Recent Labs     12/31/24  0450 01/01/25  0204   PROTIME 23.2* 23.4*   INR 2.0* 2.1*       Lipid Panel No results found for: \"CHOL\", \"TRIG\", \"HDL\", \"VLDL\", \"CHOLHDLRATIO\"   BNP No results for input(s): \"BNP\" in the last 72 hours.   Liver Enzymes Recent Labs     01/01/25  0204   ALT 70*   AST 38   ALKPHOS 52   BILITOT 0.8      Thyroid Studies No results found for: \"Z6NLLAW\", \"FT3\", \"T4FREE\", \"TSH\"       Procedures/imaging: see electronic medical records for all procedures/Xrays and details which were not copied into this note but were reviewed prior to creation of Plan         Assessment/Plan     Principal Problem:    Acute respiratory failure with hypoxia  Active Problems:    Acute respiratory failure with hypoxemia    COPD with acute exacerbation (HCC)    Primary hypertension    Status post mechanical aortic valve replacement    Cardiomyopathy (HCC)    Encounter for palliative care    Goals of care, counseling/discussion    Influenza A    History of femur fracture  Resolved Problems:    * No resolved hospital problems. *       Acute respiratory failure with hypoxia  On oxygen by nasal cannula, wean as tolerated  CTA chest no PE, showed mucous plugging right middle lobe and right lower lobe with right middle lobe airspace opacity likely volume loss.  on Solu-Medrol-now prednisone -weaning off oxygen as tolerate    Brovana and Pulmicort.  DuoNeb as needed  Incentive spirometry.  PCCM following  2 L now continue weaning off   Repeated cxr good

## 2025-01-01 NOTE — CARE COORDINATION
RNCM updated documents in [place] to [facility] and asked if had ins auth.  Awaiting response.    ANDREAS: 1/2    Discharge: [facility]    Barriers: ins auth, 2L O2 n/c-wean; positive flu    Needs: RT/PT/OT/RN    CM will continue to assess for discharge needs.   [2+] : left foot dorsalis pedis 2+ [Vibration Dec.] : diminished vibratory sensation at the level of the toes [Position Sense Dec.] : diminished position sense at the level of the toes [Diminished Throughout Right Foot] : diminished sensation with monofilament testing throughout right foot [Diminished Throughout Left Foot] : diminished sensation with monofilament testing throughout left foot [Ankle Swelling (On Exam)] : not present [Varicose Veins Of Lower Extremities] : not present [Delayed in the Right Toes] : capillary refills normal in right toes [Delayed in the Left Toes] : capillary refills normal in the left toes [FreeTextEntry3] : Hair growth noted on digits. Proximal to distal cooling is within normal limits.  [de-identified] : Painful wart right foot sub 2nd ray. [FreeTextEntry1] : She has a small 4 to 5mm wart at the right sub 2nd metatarsal towards the base of the toe. Loss of skin lines consistent with wart. Benign in appearance. Painful. Normal vision: sees adequately in most situations; can see medication labels, newsprint

## 2025-01-01 NOTE — PROGRESS NOTES
endurance, and/or tolerance for 3 hours of therapy each day at d/c.    Current research shows that an AM-PAC score of 17 (13 without stairs) or less is not associated with a discharge to the patient's home setting. Based on an AM-PAC score and their current functional mobility deficits, it is recommended that the patient have 3-5 sessions per week of Physical Therapy at d/c to increase the patient's independence.     Current research shows that an AM-PAC score of 18 (14 without stairs) or greater is associated with a discharge to the patient's home setting. Based on an AM-PAC score and their current functional mobility deficits, it is recommended that the patient have 2-3 sessions per week of Physical Therapy at d/c to increase the patient's independence.    At this time and based on an AM-PAC score, no further PT is recommended upon discharge due to (i.e. patient at baseline functional status…etc…).  Recommend patient returns to prior setting with prior services.    This AMPA score should be considered in conjunction with interdisciplinary team recommendations to determine the most appropriate discharge setting. Patient's social support, diagnosis, medical stability, and prior level of function should also be taken into consideration.     SUBJECTIVE:   Patient stated Subjective: I'm just tired.    OBJECTIVE DATA SUMMARY:   Critical Behavior:  WFL    Therapeutic Exercises:       EXERCISE   Sets   Reps   Active Active Assist   Passive Self ROM   Comments   Punches-vertical 1 10  [x] [] [] []    Punches cross body 1 10  [x] [] [] []    Rowing 2 5 [x] [] [] []    Bicep curls 1 10 [x] [] [] []    Mass grasp 1 10 [x] [] [] []    Forward trunk pulls 1 10 [x] [] [] []       [] [] [] []        Pain:  Pain level pre-treatment: 0/10  Pain level post-treatment: 0/10   Pain Intervention(s): Rest, Ice, Repositioning   Response to intervention: Nurse notified    Activity Tolerance:   Patient tolerated treatment well.  Please  refer to the flowsheet for vital signs taken during this treatment.  After treatment:   [] Patient left in no apparent distress sitting up in chair  [x] Patient left in no apparent distress in bed  [x] Call bell left within reach  [] Nursing notified  [] Caregiver present  [] Bed alarm activated  [] SCDs applied      COMMUNICATION/EDUCATION:   Patient Education  Education Given To: Patient  Education Provided: Home Exercise Program  Education Method: Verbal;Demonstration;Teach Back  Barriers to Learning: None  Education Outcome: Demonstrated understanding;Verbalized understanding;Continued education needed      [facility]  Minutes: 25

## 2025-01-01 NOTE — PLAN OF CARE
Problem: Discharge Planning  Goal: Discharge to home or other facility with appropriate resources  1/1/2025 1111 by Lucero Coleman RN  Outcome: Progressing  Flowsheets (Taken 1/1/2025 0815)  Discharge to home or other facility with appropriate resources:   Identify barriers to discharge with patient and caregiver   Arrange for needed discharge resources and transportation as appropriate   Identify discharge learning needs (meds, wound care, etc)  12/31/2024 2328 by Kaitlyn Ibarra RN  Outcome: Progressing     Problem: Safety - Adult  Goal: Free from fall injury  1/1/2025 1111 by Lucero Coleman RN  Outcome: Progressing  12/31/2024 2328 by Kaitlyn Ibarra RN  Outcome: Progressing     Problem: Chronic Conditions and Co-morbidities  Goal: Patient's chronic conditions and co-morbidity symptoms are monitored and maintained or improved  1/1/2025 1111 by Lucero Coleman RN  Outcome: Progressing  Flowsheets (Taken 1/1/2025 0815)  Care Plan - Patient's Chronic Conditions and Co-Morbidity Symptoms are Monitored and Maintained or Improved:   Monitor and assess patient's chronic conditions and comorbid symptoms for stability, deterioration, or improvement   Collaborate with multidisciplinary team to address chronic and comorbid conditions and prevent exacerbation or deterioration   Update acute care plan with appropriate goals if chronic or comorbid symptoms are exacerbated and prevent overall improvement and discharge  12/31/2024 2328 by Kaitlyn Ibarra RN  Outcome: Progressing     Problem: Skin/Tissue Integrity  Goal: Absence of new skin breakdown  Description: 1.  Monitor for areas of redness and/or skin breakdown  2.  Assess vascular access sites hourly  3.  Every 4-6 hours minimum:  Change oxygen saturation probe site  4.  Every 4-6 hours:  If on nasal continuous positive airway pressure, respiratory therapy assess nares and determine need for appliance change or resting period.  1/1/2025 1111 by Lucero Coleman

## 2025-01-01 NOTE — PLAN OF CARE
Problem: ABCDS Injury Assessment  Goal: Absence of physical injury  12/31/2024 2328 by Kaitlyn Ibarra RN  Outcome: Progressing  12/31/2024 1108 by Lucero Coleman RN  Outcome: Progressing     Problem: Skin/Tissue Integrity  Goal: Absence of new skin breakdown  Description: 1.  Monitor for areas of redness and/or skin breakdown  2.  Assess vascular access sites hourly  3.  Every 4-6 hours minimum:  Change oxygen saturation probe site  4.  Every 4-6 hours:  If on nasal continuous positive airway pressure, respiratory therapy assess nares and determine need for appliance change or resting period.  12/31/2024 2328 by Kaitlyn Ibarra RN  Outcome: Progressing  12/31/2024 1108 by Lucero Coleman RN  Outcome: Progressing     Problem: Chronic Conditions and Co-morbidities  Goal: Patient's chronic conditions and co-morbidity symptoms are monitored and maintained or improved  12/31/2024 2328 by Kaitlyn Ibarra RN  Outcome: Progressing  12/31/2024 1108 by Lucero Coleman RN  Outcome: Progressing  Flowsheets (Taken 12/31/2024 0830)  Care Plan - Patient's Chronic Conditions and Co-Morbidity Symptoms are Monitored and Maintained or Improved:   Monitor and assess patient's chronic conditions and comorbid symptoms for stability, deterioration, or improvement   Collaborate with multidisciplinary team to address chronic and comorbid conditions and prevent exacerbation or deterioration   Update acute care plan with appropriate goals if chronic or comorbid symptoms are exacerbated and prevent overall improvement and discharge     Problem: Safety - Adult  Goal: Free from fall injury  12/31/2024 2328 by Kaitlyn Ibarra RN  Outcome: Progressing  12/31/2024 1108 by Lucero Coleman RN  Outcome: Progressing     Problem: Discharge Planning  Goal: Discharge to home or other facility with appropriate resources  12/31/2024 2328 by Kaitlyn Ibarra RN  Outcome: Progressing  12/31/2024 1108 by Lucero Coleman RN  Outcome:

## 2025-01-02 LAB
INR PPP: 1.7 (ref 0.9–1.1)
PROTHROMBIN TIME: 20.5 SEC (ref 11.9–14.9)

## 2025-01-02 PROCEDURE — 97530 THERAPEUTIC ACTIVITIES: CPT

## 2025-01-02 PROCEDURE — 2500000003 HC RX 250 WO HCPCS: Performed by: HOSPITALIST

## 2025-01-02 PROCEDURE — 6370000000 HC RX 637 (ALT 250 FOR IP): Performed by: HOSPITALIST

## 2025-01-02 PROCEDURE — 94640 AIRWAY INHALATION TREATMENT: CPT

## 2025-01-02 PROCEDURE — 2700000000 HC OXYGEN THERAPY PER DAY

## 2025-01-02 PROCEDURE — 6370000000 HC RX 637 (ALT 250 FOR IP): Performed by: INTERNAL MEDICINE

## 2025-01-02 PROCEDURE — 85610 PROTHROMBIN TIME: CPT

## 2025-01-02 PROCEDURE — 1100000003 HC PRIVATE W/ TELEMETRY

## 2025-01-02 PROCEDURE — 36415 COLL VENOUS BLD VENIPUNCTURE: CPT

## 2025-01-02 PROCEDURE — 6360000002 HC RX W HCPCS: Performed by: HOSPITALIST

## 2025-01-02 RX ORDER — WARFARIN SODIUM 2.5 MG/1
5 TABLET ORAL
Status: COMPLETED | OUTPATIENT
Start: 2025-01-02 | End: 2025-01-02

## 2025-01-02 RX ORDER — IPRATROPIUM BROMIDE AND ALBUTEROL SULFATE 2.5; .5 MG/3ML; MG/3ML
1 SOLUTION RESPIRATORY (INHALATION)
Status: DISCONTINUED | OUTPATIENT
Start: 2025-01-03 | End: 2025-01-04 | Stop reason: HOSPADM

## 2025-01-02 RX ADMIN — ARFORMOTEROL TARTRATE 15 MCG: 15 SOLUTION RESPIRATORY (INHALATION) at 07:12

## 2025-01-02 RX ADMIN — WARFARIN SODIUM 5 MG: 2.5 TABLET ORAL at 19:37

## 2025-01-02 RX ADMIN — PREDNISONE 20 MG: 20 TABLET ORAL at 08:05

## 2025-01-02 RX ADMIN — SODIUM CHLORIDE, PRESERVATIVE FREE 10 ML: 5 INJECTION INTRAVENOUS at 08:05

## 2025-01-02 RX ADMIN — IPRATROPIUM BROMIDE AND ALBUTEROL SULFATE 1 DOSE: .5; 2.5 SOLUTION RESPIRATORY (INHALATION) at 10:59

## 2025-01-02 RX ADMIN — ATORVASTATIN CALCIUM 40 MG: 20 TABLET, FILM COATED ORAL at 08:05

## 2025-01-02 RX ADMIN — IPRATROPIUM BROMIDE AND ALBUTEROL SULFATE 1 DOSE: .5; 2.5 SOLUTION RESPIRATORY (INHALATION) at 07:12

## 2025-01-02 RX ADMIN — SODIUM CHLORIDE, PRESERVATIVE FREE 10 ML: 5 INJECTION INTRAVENOUS at 19:39

## 2025-01-02 RX ADMIN — IPRATROPIUM BROMIDE AND ALBUTEROL SULFATE 1 DOSE: .5; 2.5 SOLUTION RESPIRATORY (INHALATION) at 15:20

## 2025-01-02 RX ADMIN — BUDESONIDE 500 MCG: 0.5 INHALANT RESPIRATORY (INHALATION) at 07:13

## 2025-01-02 RX ADMIN — ARFORMOTEROL TARTRATE 15 MCG: 15 SOLUTION RESPIRATORY (INHALATION) at 19:12

## 2025-01-02 RX ADMIN — PANTOPRAZOLE SODIUM 40 MG: 40 TABLET, DELAYED RELEASE ORAL at 06:43

## 2025-01-02 RX ADMIN — BUDESONIDE 500 MCG: 0.5 INHALANT RESPIRATORY (INHALATION) at 19:12

## 2025-01-02 NOTE — PLAN OF CARE
Problem: Discharge Planning  Goal: Discharge to home or other facility with appropriate resources  Outcome: Progressing     Problem: Safety - Adult  Goal: Free from fall injury  Outcome: Progressing     Problem: Chronic Conditions and Co-morbidities  Goal: Patient's chronic conditions and co-morbidity symptoms are monitored and maintained or improved  Outcome: Progressing     Problem: Skin/Tissue Integrity  Goal: Absence of new skin breakdown  Description: 1.  Monitor for areas of redness and/or skin breakdown  2.  Assess vascular access sites hourly  3.  Every 4-6 hours minimum:  Change oxygen saturation probe site  4.  Every 4-6 hours:  If on nasal continuous positive airway pressure, respiratory therapy assess nares and determine need for appliance change or resting period.  Outcome: Progressing     Problem: ABCDS Injury Assessment  Goal: Absence of physical injury  Outcome: Progressing     Problem: Pain  Goal: Verbalizes/displays adequate comfort level or baseline comfort level  Outcome: Progressing

## 2025-01-02 NOTE — PROGRESS NOTES
Physical Therapy Goals:  Continued 1/2/2025 to be met within 7-10 days.  Short Term Goals  Short Term Goal 1: Patient will perform supine to sit with moderate assist.  Short Term Goal 2: Patient will demonstrate Fair static sitting balance EOB with UE support for ADL activity.  Short Term Goal 3: Patient will perform bed to/from chair transfer with ModA    PHYSICAL THERAPY TREATMENT    Patient: Ritika Clancy (84 y.o. female)  Date: 1/2/2025  Diagnosis: Acute respiratory failure with hypoxia [J96.01] Acute respiratory failure with hypoxia  Precautions: Fall Risk    ASSESSMENT:  Patient pleasant and cooperative with PT session. Prior to admission at patient's baseline level, pt was able to roll without assistance, transition to edge of bed with assist from son and son would assist her in sliding board transfer to/from wheelchair. Then patient was able to participate in wheelchair mobility without assistance.   Patient currently requires Min-ModA for rolling. Patient requires MaxA for transition to sitting EOB and Max-totalA for lateral scooting in seated position. Patient with fair static sitting balance. Patient able to participate in R LE AROM exercises and minimal L knee PROM due to history of femur fracture. Patient is participating with skilled PT sessions and progressing slowly however still remains below her baseline level and would benefit from SNF placement to restore PLOF.     Progression toward goals:   []      Improving appropriately and progressing toward goals  [x]      Improving slowly and progressing toward goals  []      Not making progress toward goals and plan of care will be adjusted     PLAN:  Patient continues to benefit from skilled intervention to address the above impairments.  Continue treatment per established plan of care.    Further Equipment Recommendations for Discharge:   Yes (TBD at facility)    [facility]:   AM-PAC Inpatient Mobility without Stair Climbing Raw Score : 8    This [facility]

## 2025-01-02 NOTE — PROGRESS NOTES
Received message from NICOLA at 1048 for P2P and the deadline will be at 12 :00 Talked with the medical director-he refused to release his name to protect him self. He requested CM to fax documentation stated pt previous physical condition and current condition-the deadline will be 1:30 PM -2 hrs window -not tell me the fax number. NICOLA informed.

## 2025-01-02 NOTE — PROGRESS NOTES
Warfarin Dosing - Pharmacy Consult Note    Consulting Provider: Dr. Olivia       Indication:  Mechanical Heart Valve (atrial)     Warfarin Dose prior to admission: 5 mg Daily      Significant Drug Interactions: No obvious interactions    Recent Labs     12/31/24  0450 01/01/25  0204 01/02/25  0508   INR 2.0* 2.1* 1.7*   HGB 12.1 12.2  --     235  --      Recent warfarin administrations                     warfarin (COUMADIN) tablet 3 mg (mg) 3 mg Given 01/01/25 1707    warfarin (COUMADIN) tablet 2.5 mg (mg) 2.5 mg Given 12/31/24 1715    warfarin (COUMADIN) tablet 2 mg (mg) 2 mg Given 12/30/24 2043                    Assessment/Plan  (Goal INR: 2 - 3)  Warfarin 5mg ordered  INR currently at 1.7  Will reevaluate based on INR tomorrow    Active problem list reviewed.  INR orders are placed.  Chart reviewed for pertinent labs, drug/diet interactions, and past doses.  Documentation of patient's clinical condition was reviewed.    Pharmacy Dosing:  Pharmacy will continue to follow.

## 2025-01-02 NOTE — PLAN OF CARE
Problem: Discharge Planning  Goal: Discharge to home or other facility with appropriate resources  1/1/2025 2028 by Kaitlyn Ibarra RN  Outcome: Progressing  1/1/2025 1111 by Lucero Coleman RN  Outcome: Progressing  Flowsheets (Taken 1/1/2025 0815)  Discharge to home or other facility with appropriate resources:   Identify barriers to discharge with patient and caregiver   Arrange for needed discharge resources and transportation as appropriate   Identify discharge learning needs (meds, wound care, etc)     Problem: Safety - Adult  Goal: Free from fall injury  1/1/2025 2028 by Kaitlyn Ibarra RN  Outcome: Progressing  1/1/2025 1111 by Lucero Coleman RN  Outcome: Progressing     Problem: Chronic Conditions and Co-morbidities  Goal: Patient's chronic conditions and co-morbidity symptoms are monitored and maintained or improved  1/1/2025 2028 by Kaitlyn Ibarra RN  Outcome: Progressing  1/1/2025 1111 by Lucero Coleman RN  Outcome: Progressing  Flowsheets (Taken 1/1/2025 0815)  Care Plan - Patient's Chronic Conditions and Co-Morbidity Symptoms are Monitored and Maintained or Improved:   Monitor and assess patient's chronic conditions and comorbid symptoms for stability, deterioration, or improvement   Collaborate with multidisciplinary team to address chronic and comorbid conditions and prevent exacerbation or deterioration   Update acute care plan with appropriate goals if chronic or comorbid symptoms are exacerbated and prevent overall improvement and discharge     Problem: Skin/Tissue Integrity  Goal: Absence of new skin breakdown  Description: 1.  Monitor for areas of redness and/or skin breakdown  2.  Assess vascular access sites hourly  3.  Every 4-6 hours minimum:  Change oxygen saturation probe site  4.  Every 4-6 hours:  If on nasal continuous positive airway pressure, respiratory therapy assess nares and determine need for appliance change or resting period.  1/1/2025 2028 by Kaitlyn Ibarra

## 2025-01-02 NOTE — CARE COORDINATION
CM reached out to son to come up with an alternative discharge plan in case of SNF denial. Patient's son is agreeable to home health care, does not have a preference, would like CM to send out to  agencies for acceptance. Referrals send and CM will continue to follow. SNF placement still pending for final decision by insurance.

## 2025-01-02 NOTE — CARE COORDINATION
Patient's insurance is requesting a peer to peer to be done. The provider will need to provide the patient's name,  - 1940, and member ID number - [id number]. The peer to peer is due by today by 12pm. The number is [phone] option 5.   Provider made aware.

## 2025-01-02 NOTE — PROGRESS NOTES
infection  Received vancomycin, ceftriaxone, azithromycin.   Trend temps, wbc, LA improving.     Cardiomyopathy  Last echo June 2024 with EF of 45 to 50%, mildly decreased left ventricular systolic function.  Status post aortic valve replacement, mechanical valve.  On anticoagulation with warfarin.  Pharmacy to dose.  INR    Hypokalemia   K replacement       Advanced, palliative care team consult for CODE STATUS-dnr/I   PT OT  Aspiration precaution  Speech evaluation    Discharge to; , [] Home  [x]SNF/Rehab  []  Others  in  1 Days, []  stable for DC         Case discussed with:  [x]Patient  []Family  [x]Nursing  [x]Case Management [] Consultants  DVT Prophylaxis:  []Lovenox  []Hep SQ  []SCDs  [x]Coumadin, Eliquis, Xarelto, Pradaxa   []On Heparin gtt. [] No indication [] refused     TIME: 55 minutes were spent on the care of this patient today,  This time also includes physician non-face-to-face service time visit on the date of service such as  Preparing to see the patient (eg, review of tests)  Obtaining and/or reviewing separately obtained history  Performing a medically necessary appropriate examination and/or evaluation  Counseling and educating the patient/family/caregiver  Ordering medications, tests, or procedures  Referring and communicating with other health care professionals as needed  Documenting clinical information in the electronic or other health record  Independently interpreting results (not reported separately) and communicating results to the patient/family/caregiver  Care coordination and discharge planning with Case Management.      Dear patient, if you are reviewing this note and have a question regarding the medical terminology, please bring it with you to your next PCP visit.  Medical notes are meant to be a communication between medical professionals.  Additionally, portion of this note were created using voice recognition function, syntax and phonetic over may have escaped  proofreading.    Lizbeth Hayes MD

## 2025-01-03 LAB
ANION GAP SERPL CALC-SCNC: 5 MMOL/L (ref 3–18)
BUN SERPL-MCNC: 23 MG/DL (ref 7–18)
BUN/CREAT SERPL: 32 (ref 12–20)
CALCIUM SERPL-MCNC: 9.1 MG/DL (ref 8.5–10.1)
CHLORIDE SERPL-SCNC: 112 MMOL/L (ref 100–111)
CO2 SERPL-SCNC: 28 MMOL/L (ref 21–32)
CREAT SERPL-MCNC: 0.71 MG/DL (ref 0.6–1.3)
GLUCOSE SERPL-MCNC: 130 MG/DL (ref 74–99)
INR PPP: 1.6 (ref 0.9–1.1)
MAGNESIUM SERPL-MCNC: 2.2 MG/DL (ref 1.6–2.6)
POTASSIUM SERPL-SCNC: 4 MMOL/L (ref 3.5–5.5)
PROTHROMBIN TIME: 19.5 SEC (ref 11.9–14.9)
SODIUM SERPL-SCNC: 145 MMOL/L (ref 136–145)

## 2025-01-03 PROCEDURE — 85610 PROTHROMBIN TIME: CPT

## 2025-01-03 PROCEDURE — 6370000000 HC RX 637 (ALT 250 FOR IP): Performed by: HOSPITALIST

## 2025-01-03 PROCEDURE — 83735 ASSAY OF MAGNESIUM: CPT

## 2025-01-03 PROCEDURE — 94640 AIRWAY INHALATION TREATMENT: CPT

## 2025-01-03 PROCEDURE — 2700000000 HC OXYGEN THERAPY PER DAY

## 2025-01-03 PROCEDURE — 80048 BASIC METABOLIC PNL TOTAL CA: CPT

## 2025-01-03 PROCEDURE — 6370000000 HC RX 637 (ALT 250 FOR IP): Performed by: INTERNAL MEDICINE

## 2025-01-03 PROCEDURE — 1100000003 HC PRIVATE W/ TELEMETRY

## 2025-01-03 PROCEDURE — 36415 COLL VENOUS BLD VENIPUNCTURE: CPT

## 2025-01-03 PROCEDURE — 6360000002 HC RX W HCPCS: Performed by: HOSPITALIST

## 2025-01-03 PROCEDURE — 2500000003 HC RX 250 WO HCPCS: Performed by: HOSPITALIST

## 2025-01-03 RX ORDER — PREDNISONE 10 MG/1
10 TABLET ORAL DAILY
Status: DISCONTINUED | OUTPATIENT
Start: 2025-01-04 | End: 2025-01-04 | Stop reason: HOSPADM

## 2025-01-03 RX ORDER — FLUTICASONE PROPIONATE AND SALMETEROL 250; 50 UG/1; UG/1
1 POWDER RESPIRATORY (INHALATION) 2 TIMES DAILY
Qty: 60 EACH | Refills: 0 | Status: SHIPPED | OUTPATIENT
Start: 2025-01-03

## 2025-01-03 RX ORDER — WARFARIN SODIUM 2.5 MG/1
5 TABLET ORAL
Status: COMPLETED | OUTPATIENT
Start: 2025-01-03 | End: 2025-01-03

## 2025-01-03 RX ORDER — WARFARIN SODIUM 5 MG/1
5 TABLET ORAL DAILY
COMMUNITY
Start: 2024-11-16 | End: 2025-11-16

## 2025-01-03 RX ORDER — PANTOPRAZOLE SODIUM 40 MG/1
40 TABLET, DELAYED RELEASE ORAL
Qty: 30 TABLET | Refills: 3 | Status: CANCELLED | OUTPATIENT
Start: 2025-01-04

## 2025-01-03 RX ADMIN — IPRATROPIUM BROMIDE AND ALBUTEROL SULFATE 1 DOSE: .5; 2.5 SOLUTION RESPIRATORY (INHALATION) at 19:03

## 2025-01-03 RX ADMIN — ARFORMOTEROL TARTRATE 15 MCG: 15 SOLUTION RESPIRATORY (INHALATION) at 19:03

## 2025-01-03 RX ADMIN — CARVEDILOL 3.12 MG: 3.12 TABLET, FILM COATED ORAL at 18:01

## 2025-01-03 RX ADMIN — BUDESONIDE 500 MCG: 0.5 INHALANT RESPIRATORY (INHALATION) at 19:03

## 2025-01-03 RX ADMIN — BUDESONIDE 500 MCG: 0.5 INHALANT RESPIRATORY (INHALATION) at 07:25

## 2025-01-03 RX ADMIN — IPRATROPIUM BROMIDE AND ALBUTEROL SULFATE 1 DOSE: .5; 2.5 SOLUTION RESPIRATORY (INHALATION) at 07:25

## 2025-01-03 RX ADMIN — SODIUM CHLORIDE, PRESERVATIVE FREE 10 ML: 5 INJECTION INTRAVENOUS at 08:08

## 2025-01-03 RX ADMIN — WARFARIN SODIUM 5 MG: 2.5 TABLET ORAL at 20:13

## 2025-01-03 RX ADMIN — PREDNISONE 20 MG: 20 TABLET ORAL at 08:05

## 2025-01-03 RX ADMIN — PANTOPRAZOLE SODIUM 40 MG: 40 TABLET, DELAYED RELEASE ORAL at 05:49

## 2025-01-03 RX ADMIN — ARFORMOTEROL TARTRATE 15 MCG: 15 SOLUTION RESPIRATORY (INHALATION) at 07:25

## 2025-01-03 RX ADMIN — ATORVASTATIN CALCIUM 40 MG: 20 TABLET, FILM COATED ORAL at 08:05

## 2025-01-03 ASSESSMENT — PAIN SCALES - GENERAL
PAINLEVEL_OUTOF10: 0
PAINLEVEL_OUTOF10: 0

## 2025-01-03 NOTE — PROGRESS NOTES
Palliative Medicine    After meeting/speaking with patient and her son, Frank , the goals of care have been defined.  Code status remains: DNR/DNI   (DDNR on file) AMD status: on file naming her son, Frank, and Mehnaz, daughter as her MPOAs Will sign off but remain available for reconsult as needed/if appropriate.     Thank you for the Palliative Medicine consult and allowing us to participate in the care of Ritika Clancy      Gabriela Shipman RN, MSN  Palliative Medicine   [phone]

## 2025-01-03 NOTE — PROGRESS NOTES
Moderate Risk Nutrition Assessment Follow-Up    Type and Reason for Visit: Reassess    Nutrition Recommendations/Plan:   Cont diet and cont Glucerna Shakes as alistair provides 220kcal, 10g pro per carton   Cont to monitor lytes and replete as needed  Consider adding MVI w/min daily given at risk for skin breakdown  Consider checking vit D25 hydroxy level and suppl if low  Cont to monitor wt trends, lytes, UOP, bowel fx, skin integrity, and adjust recs as needed     Malnutrition Assessment:  Malnutrition Status: At risk for malnutrition    Nutrition Assessment:  83yo F with acute resp failure w/hypoxia, on O2NC, mucuous plug, +influenza A out of window for Tamiflu, cardiomyopathy, last echo June 2024 EF 45-50%, s/p aortic valve replacement, mechanical valve, on anticoagulation with warfarin per MD. K improved. alert on O2. improving PO intakes was poor to fair now mostly fair to good with some variable good as well. wt trends slight down since admit ?fluid vs accuracy. good UOP -1L.  reported sig wt loss PTA however wt hx does not support this as wt is up from wt hx 54kg (July '24) if stated admit wt correct. with no reported poor PO PTA.  Poss with fluid wt fluctuations PTA.    Estimated Daily Nutrient Needs:  Energy (kcal):  1700kcal/day Weight Used for Energy Requirements: Current     Protein (g):  70g pro/day Weight Used for Protein Requirements: Current        Fluid (ml/day):  1700ml fluid/day Method Used for Fluid Requirements: 1 ml/kcal    Nutrition Related Findings:   K 4.4, lipitor, protonix, prednisone Wound Type: None (at high risk for skin breakdown r/t evan score)    Current Nutrition Therapies:    ADULT DIET; Regular; Low Fat/Low Chol/High Fiber/KATIE  ADULT ORAL NUTRITION SUPPLEMENT; Breakfast; Diabetic Oral Supplement    Anthropometric Measures:  Height: 162.6 cm (5' 4.02\")  Current Body Wt: 64 kg (141 lb 1.5 oz)   BMI: 24.2    Nutrition Diagnosis:   Increased nutrient needs related to increase demand for  energy/nutrients, cardiac dysfunction (adv age with COPD and flu) as evidenced by variable po intake, intake 51-75%    Nutrition Interventions:   Food and/or Nutrient Delivery: Continue Current Diet, Continue Oral Nutrition Supplement, Vitamin Supplement  Nutrition Education/Counseling: Education/Counseling not indicated  Coordination of Nutrition Care: Continue to monitor while inpatient    Goals:  Goals: Meet at least 75% of estimated needs (prevent skin breakdown)  Type of Goal: Continue current goal  Previous Goal Met: Progressing toward Goal(s)    Nutrition Monitoring and Evaluation:   Behavioral-Environmental Outcomes: None Identified  Food/Nutrient Intake Outcomes: Food and Nutrient Intake, Supplement Intake, Vitamin/Mineral Intake  Physical Signs/Symptoms Outcomes: Biochemical Data    Discharge Planning:     (TBD)     Nella Camara MS, RD  Clinical Dietitian  E: [email or web address]  O:  [phone]  C:  [phone]

## 2025-01-03 NOTE — PROGRESS NOTES
CALCIUM 9.0   BILITOT 0.8   AST 38   ALT 70*   ALKPHOS 52   GLOB 3.0   LABGLOM 88      CBC w/Diff Recent Labs     01/01/25  0204   WBC 8.3   RBC 3.81*   HGB 12.2   HCT 37.8      LYMPHOPCT 17*      Cardiac Enzymes No results for input(s): \"CKTOTAL\", \"CKMB\", \"CKMBINDEX\", \"TROPONINI\" in the last 72 hours.    Invalid input(s): \"CORBY\"   Coagulation Recent Labs     01/02/25  0508 01/03/25  0356   PROTIME 20.5* 19.5*   INR 1.7* 1.6*       Lipid Panel No results found for: \"CHOL\", \"TRIG\", \"HDL\", \"VLDL\", \"CHOLHDLRATIO\"   BNP No results for input(s): \"BNP\" in the last 72 hours.   Liver Enzymes Recent Labs     01/01/25  0204   ALT 70*   AST 38   ALKPHOS 52   BILITOT 0.8      Thyroid Studies No results found for: \"F6RCNJG\", \"FT3\", \"T4FREE\", \"TSH\"       Procedures/imaging: see electronic medical records for all procedures/Xrays and details which were not copied into this note but were reviewed prior to creation of Plan         Assessment/Plan     Principal Problem:    Acute respiratory failure with hypoxia  Active Problems:    Acute respiratory failure with hypoxemia    COPD with acute exacerbation (HCC)    Primary hypertension    Status post mechanical aortic valve replacement    Cardiomyopathy (HCC)    Encounter for palliative care    Goals of care, counseling/discussion    Influenza A    History of femur fracture  Resolved Problems:    * No resolved hospital problems. *       Acute respiratory failure with hypoxia  On oxygen by nasal cannula, wean as tolerated  CTA chest no PE, showed mucous plugging right middle lobe and right lower lobe with right middle lobe airspace opacity likely volume loss.  on Solu-Medrol-now prednisone -weaning off oxygen as tolerate    Brovana and Pulmicort.  DuoNeb as needed  Incentive spirometry.  PCCM following  1 L now continue weaning off if she can tolerated   Repeated cxr good            influenza A  Out of window for Tamiflu  Continue monitoring for complication from fluid  infection  Received vancomycin, ceftriaxone, azithromycin.   Trend temps, wbc, LA improving.     Cardiomyopathy  Last echo June 2024 with EF of 45 to 50%, mildly decreased left ventricular systolic function.  Status post aortic valve replacement, mechanical valve.  On anticoagulation with warfarin.  Pharmacy to dose.  INR daily 1.6 today not reach the gaol meds dose will be adjusted per pharmacist     Hypokalemia   K replacement   Will recheck today       Advanced, palliative care team consult for CODE STATUS-dnr/I   PT OT  Aspiration precaution  Speech evaluation    Discharge to; , [] Home  [x]SNF/Rehab  []  Others  in  1 Days, []  stable for DC         Case discussed with:  [x]Patient  []Family  [x]Nursing  [x]Case Management [] Consultants  DVT Prophylaxis:  []Lovenox  []Hep SQ  []SCDs  [x]Coumadin, Eliquis, Xarelto, Pradaxa   []On Heparin gtt. [] No indication [] refused     TIME: 55 minutes were spent on the care of this patient today,  This time also includes physician non-face-to-face service time visit on the date of service such as  Preparing to see the patient (eg, review of tests)  Obtaining and/or reviewing separately obtained history  Performing a medically necessary appropriate examination and/or evaluation  Counseling and educating the patient/family/caregiver  Ordering medications, tests, or procedures  Referring and communicating with other health care professionals as needed  Documenting clinical information in the electronic or other health record  Independently interpreting results (not reported separately) and communicating results to the patient/family/caregiver  Care coordination and discharge planning with Case Management.      Dear patient, if you are reviewing this note and have a question regarding the medical terminology, please bring it with you to your next PCP visit.  Medical notes are meant to be a communication between medical professionals.  Additionally, portion of this note were

## 2025-01-03 NOTE — PROGRESS NOTES
Physical Therapy    1327: Pt refusing PT at this time, watching TV and eating snacks.  States she is going home tomorrow.

## 2025-01-03 NOTE — PROGRESS NOTES
Case management specialist met with patient to review and go over Important Message from Medicare letter in which patient verbally acknowledged understanding. A copy of letter was left in room with patient.

## 2025-01-03 NOTE — PLAN OF CARE
Problem: Discharge Planning  Goal: Discharge to home or other facility with appropriate resources  1/2/2025 2032 by Halina Gomez RN  Outcome: Progressing  1/2/2025 1254 by Miriam Vance RN  Outcome: Progressing     Problem: Safety - Adult  Goal: Free from fall injury  1/2/2025 2032 by Halina Gomez RN  Outcome: Progressing  1/2/2025 1254 by Miriam Vance RN  Outcome: Progressing     Problem: Chronic Conditions and Co-morbidities  Goal: Patient's chronic conditions and co-morbidity symptoms are monitored and maintained or improved  1/2/2025 2032 by Halina Gomez RN  Outcome: Progressing  1/2/2025 1254 by Miriam Vance RN  Outcome: Progressing     Problem: Skin/Tissue Integrity  Goal: Absence of new skin breakdown  Description: 1.  Monitor for areas of redness and/or skin breakdown  2.  Assess vascular access sites hourly  3.  Every 4-6 hours minimum:  Change oxygen saturation probe site  4.  Every 4-6 hours:  If on nasal continuous positive airway pressure, respiratory therapy assess nares and determine need for appliance change or resting period.  1/2/2025 2032 by Halina Gomez RN  Outcome: Progressing  1/2/2025 1254 by Miriam Vance RN  Outcome: Progressing     Problem: ABCDS Injury Assessment  Goal: Absence of physical injury  1/2/2025 2032 by Halina Gomez RN  Outcome: Progressing  1/2/2025 1254 by Miriam Vance RN  Outcome: Progressing     Problem: Pain  Goal: Verbalizes/displays adequate comfort level or baseline comfort level  1/2/2025 2032 by Halina Gomez RN  Outcome: Progressing  1/2/2025 1254 by Miriam Vance RN  Outcome: Progressing

## 2025-01-03 NOTE — PLAN OF CARE
Problem: Discharge Planning  Goal: Discharge to home or other facility with appropriate resources  1/3/2025 0831 by Miriam Vance RN  Outcome: Progressing  1/2/2025 2032 by Halina Gomez RN  Outcome: Progressing     Problem: Safety - Adult  Goal: Free from fall injury  1/3/2025 0831 by Miriam Vance RN  Outcome: Progressing  1/2/2025 2032 by Halina Gomez RN  Outcome: Progressing     Problem: Chronic Conditions and Co-morbidities  Goal: Patient's chronic conditions and co-morbidity symptoms are monitored and maintained or improved  1/3/2025 0831 by Miriam Vance RN  Outcome: Progressing  1/2/2025 2032 by Halina Gomez RN  Outcome: Progressing     Problem: Skin/Tissue Integrity  Goal: Absence of new skin breakdown  Description: 1.  Monitor for areas of redness and/or skin breakdown  2.  Assess vascular access sites hourly  3.  Every 4-6 hours minimum:  Change oxygen saturation probe site  4.  Every 4-6 hours:  If on nasal continuous positive airway pressure, respiratory therapy assess nares and determine need for appliance change or resting period.  1/3/2025 0831 by Miriam Vance RN  Outcome: Progressing  1/2/2025 2032 by Halina Gomez RN  Outcome: Progressing     Problem: ABCDS Injury Assessment  Goal: Absence of physical injury  1/3/2025 0831 by Miriam Vance RN  Outcome: Progressing  1/2/2025 2032 by Halina Gomez RN  Outcome: Progressing     Problem: Pain  Goal: Verbalizes/displays adequate comfort level or baseline comfort level  1/3/2025 0831 by Miriam Vance RN  Outcome: Progressing  1/2/2025 2032 by Halina Gomez RN  Outcome: Progressing

## 2025-01-03 NOTE — CARE COORDINATION
Patient denied auth for SNF. CM spoke to patient's son Frank Jean Baptiste [phone] who states that he has been in [place] but is getting on a train today and will be back late this evening with arrival time after 8 pm. Son states that there is no other family available and that he is the only caregiver for the patient and that he will not be able to accept the patient to home this evening. Son states that he does not have a vechicle and depends on a neighbor for transportation. MD stated discharge tomorrow. CM arranged transport via [facility] for tomorrow at 9 AM.

## 2025-01-03 NOTE — PROGRESS NOTES
SNF Authorization  1/3/2025, 9:15 AM    Patient Name: Ritika Clancy                   YOB: 1940      AUTH DENIED        Received via: [employer]  Auth ID:  [id number]  Plan Auth ID: [id number]  Service:  [facility]  Approval Dates: AUTH DENIED  Next Review Date: N/A        Marquis Phillips  Case Management Department  Ph: [phone] Fax: [phone]

## 2025-01-03 NOTE — WOUND CARE
Wound Care Note:    Chart audited for low juan score of 14, patient with high risk for skin breakdown.  Does patient have documentation of pressure injury?  no.  Juan score order set started on 1/3/2025       Skin Care & Pressure Relief Recommendations  Minimize layers of linen  Pads under patient to optimize support surface  Turn/reposition approximately every 2 hours  Use pillow wedges to maintain positioning but do not put pillow directly over wounds  Manage incontinence   Promote continence; Skin Protective lotion/cream to buttocks and sacrum daily and as needed with incontinence care  Offload heels pillows    Consult wound care if any wounds noted during admission

## 2025-01-03 NOTE — PROGRESS NOTES
Warfarin Dosing - Pharmacy Consult Note  Consulting Provider: Dr. Olivia     Indication:  Mechanical Heart Valve (atrial)  Warfarin Dose prior to admission: 5 mg po daily      Concurrent anticoagulants/antiplatelets: none  Significant Drug Interactions: No obvious interactions    Recent Labs     01/01/25  0204 01/02/25  0508 01/03/25  0356   INR 2.1* 1.7* 1.6*   HGB 12.2  --   --      --   --      Recent warfarin administrations                     warfarin (COUMADIN) tablet 5 mg (mg) 5 mg Given 01/02/25 1937    warfarin (COUMADIN) tablet 3 mg (mg) 3 mg Given 01/01/25 1707    warfarin (COUMADIN) tablet 2.5 mg (mg) 2.5 mg Given 12/31/24 1715                   Assessment/Plan  (Goal INR: 2 - 3)  Warfarin 5 mg po ordered for today @ 1800    Active problem list reviewed.  INR orders are placed.  Chart reviewed for pertinent labs, drug/diet interactions, and past doses.  Documentation of patient's clinical condition was reviewed.    Pharmacy Dosing:  Pharmacy will continue to follow.

## 2025-01-04 VITALS
TEMPERATURE: 97.5 F | RESPIRATION RATE: 18 BRPM | HEIGHT: 64 IN | DIASTOLIC BLOOD PRESSURE: 65 MMHG | BODY MASS INDEX: 24.24 KG/M2 | SYSTOLIC BLOOD PRESSURE: 116 MMHG | OXYGEN SATURATION: 100 % | HEART RATE: 73 BPM | WEIGHT: 141.98 LBS

## 2025-01-04 LAB
INR PPP: 1.8 (ref 0.9–1.1)
PROTHROMBIN TIME: 20.8 SEC (ref 11.9–14.9)

## 2025-01-04 PROCEDURE — 6370000000 HC RX 637 (ALT 250 FOR IP): Performed by: HOSPITALIST

## 2025-01-04 PROCEDURE — 2500000003 HC RX 250 WO HCPCS: Performed by: HOSPITALIST

## 2025-01-04 PROCEDURE — 94640 AIRWAY INHALATION TREATMENT: CPT

## 2025-01-04 PROCEDURE — 85610 PROTHROMBIN TIME: CPT

## 2025-01-04 PROCEDURE — 6360000002 HC RX W HCPCS: Performed by: HOSPITALIST

## 2025-01-04 PROCEDURE — 6370000000 HC RX 637 (ALT 250 FOR IP): Performed by: INTERNAL MEDICINE

## 2025-01-04 PROCEDURE — 36415 COLL VENOUS BLD VENIPUNCTURE: CPT

## 2025-01-04 RX ORDER — WARFARIN SODIUM 7.5 MG/1
7.5 TABLET ORAL
Status: DISCONTINUED | OUTPATIENT
Start: 2025-01-04 | End: 2025-01-04 | Stop reason: HOSPADM

## 2025-01-04 RX ADMIN — ARFORMOTEROL TARTRATE 15 MCG: 15 SOLUTION RESPIRATORY (INHALATION) at 07:17

## 2025-01-04 RX ADMIN — ATORVASTATIN CALCIUM 40 MG: 20 TABLET, FILM COATED ORAL at 08:46

## 2025-01-04 RX ADMIN — PANTOPRAZOLE SODIUM 40 MG: 40 TABLET, DELAYED RELEASE ORAL at 06:20

## 2025-01-04 RX ADMIN — IPRATROPIUM BROMIDE AND ALBUTEROL SULFATE 1 DOSE: .5; 2.5 SOLUTION RESPIRATORY (INHALATION) at 07:17

## 2025-01-04 RX ADMIN — CARVEDILOL 3.12 MG: 3.12 TABLET, FILM COATED ORAL at 08:46

## 2025-01-04 RX ADMIN — SODIUM CHLORIDE, PRESERVATIVE FREE 10 ML: 5 INJECTION INTRAVENOUS at 08:46

## 2025-01-04 RX ADMIN — PREDNISONE 10 MG: 10 TABLET ORAL at 08:46

## 2025-01-04 RX ADMIN — BUDESONIDE 500 MCG: 0.5 INHALANT RESPIRATORY (INHALATION) at 07:17

## 2025-01-04 ASSESSMENT — PAIN SCALES - GENERAL
PAINLEVEL_OUTOF10: 0
PAINLEVEL_OUTOF10: 0

## 2025-01-04 NOTE — DISCHARGE SUMMARY
Discharge Summary    Patient: Ritika Clancy               Sex: female          DOA: 12/24/2024         YOB: 1940      Age:  84 y.o.        LOS:  LOS: 11 days                Admit Date: 12/24/2024    Discharge Date: 1/4/2025    Admission Diagnoses: Acute respiratory failure with hypoxia [J96.01]    Discharge Diagnoses:    Hospital Problems             Last Modified POA    * (Principal) Acute respiratory failure with hypoxia 12/24/2024 Yes    Acute respiratory failure with hypoxemia 12/24/2024 Yes    COPD with acute exacerbation (HCC) 12/24/2024 Yes    Primary hypertension 12/24/2024 Yes    Status post mechanical aortic valve replacement 12/24/2024 Yes    Cardiomyopathy (HCC) 12/24/2024 Yes    Encounter for palliative care 12/30/2024 Yes    Goals of care, counseling/discussion 12/30/2024 Yes    Influenza A 12/30/2024 Yes    History of femur fracture 12/30/2024 Yes        Discharge Condition: Stable    Hospital Course ;   Ritika Clancy is a 84 y.o. female with COPD, hypertension, aortic valve replacement on Coumadin, cardiomyopathy presents to ER with concerns of shortness of breath and cough that has been progressively getting worse for the past 10 days.  Patient reports that she has been coughing up a lot of mucus.  She reports that her son got sick with upper respiratory tract infection.  She thinks that she got the symptoms from her son.  She denies any fever, chills, headache, nausea, vomiting.  EMS was called and her oxygen saturation was noted to be in 70s she was placed on nonrebreather.  In ER she was noted to have temperature of 101.8.  She was placed on high flow oxygen.  CTA chest no PE, showed mucous plugging right middle lobe and right lower lobe with right middle lobe airspace opacity likely volume loss.  Her white count in normal range     She was admitted for acute respiratory failure with hypoxia -resolved on dc   Acute respiratory failure with hypoxia  CTA chest no PE,  showed mucous plugging right middle lobe and right lower lobe with right middle lobe airspace opacity likely volume loss.  Completed  steroid and will continue breathing treatment as needed                 influenza A  Out of window for Tamiflu  Continue monitoring for complication from fluid infection  Received vancomycin, ceftriaxone, azithromycin.   Trend temps, wbc, LA improving.     Cardiomyopathy  Last echo June 2024 with EF of 45 to 50%, mildly decreased left ventricular systolic function.  Status post aortic valve replacement, mechanical valve.  On anticoagulation with warfarin.  Pharmacy to dose warfarin, continue to f/u with pcp for inr check and medication dose adjustment      Hypokalemia- resolved          Advanced, palliative care team consult for CODE STATUS-dnr/I   PT OT  Aspiration precaution  Speech evaluation  Not getting approval for rehab, pt-son will continue to take care of her at home.   Physical Exam:  /65   Pulse 73   Temp 97.5 °F (36.4 °C) (Oral)   Resp 18   Ht 1.626 m (5' 4.02\")   Wt 64.4 kg (141 lb 15.6 oz)   SpO2 100%   BMI 24.36 kg/m²   General appearance: alert, cooperative, no distress, appears stated age  Head: Normocephalic, without obvious abnormality, atraumatic  Neck: supple, trachea midline  Lungs: clear to auscultation bilaterally  Heart: regular rate and rhythm, S1, S2 normal, no murmur, click, rub or gallop  Abdomen: soft, non-tender. Bowel sounds normal. No masses,  no organomegaly  Extremities: extremities normal, atraumatic, no cyanosis or edema  Skin: Skin color, texture, turgor normal. No rashes or lesions  Neurologic: Grossly normal    Labs: Results:       Chemistry Recent Labs     01/03/25  1341      K 4.0   *   CO2 28   BUN 23*      CBC w/Diff No results for input(s): \"WBC\", \"RBC\", \"HGB\", \"HCT\", \"PLT\" in the last 72 hours.    Invalid input(s): \"GRANS\", \"LYMPH\", \"EOS\"   Cardiac Enzymes No results for input(s): \"CPK\" in the last 72

## 2025-01-04 NOTE — PROGRESS NOTES
Pharmacy Routine Monitoring of Warfarin (INR)  Active order for anticoagulants/antiplatelets: Warfarin  Significant drug interactions: No obvious interactions  Goal INR: 2 - 3  Recent Labs     01/02/25  0508 01/03/25  0356 01/04/25  0131   INR 1.7* 1.6* 1.8*     Date INR Dose  1/4          1.8         5 mg   Recent warfarin administrations                     warfarin (COUMADIN) tablet 5 mg (mg) 5 mg Given 01/03/25 2013    warfarin (COUMADIN) tablet 5 mg (mg) 5 mg Given 01/02/25 1937    warfarin (COUMADIN) tablet 3 mg (mg) 3 mg Given 01/01/25 1707                  Assessment/Plan:       Warfarin 7.5 mg x 1 ordered for 1/4/25 at 1800  INR orders are placed.    Dosing strategy is reasonable. No obvious intervention needed..

## 2025-01-04 NOTE — PLAN OF CARE
Problem: Discharge Planning  Goal: Discharge to home or other facility with appropriate resources  1/4/2025 0848 by Breanna Keating, RN  Outcome: Adequate for Discharge  Flowsheets (Taken 1/4/2025 0848)  Discharge to home or other facility with appropriate resources:   Identify barriers to discharge with patient and caregiver   Identify discharge learning needs (meds, wound care, etc)   Arrange for needed discharge resources and transportation as appropriate   Refer to discharge planning if patient needs post-hospital services based on physician order or complex needs related to functional status, cognitive ability or social support system  1/4/2025 0133 by Paula Hunt, RN  Outcome: Progressing

## 2025-01-13 NOTE — HOME HEALTH
Refill sent, however patient due for office visit follow-up, please schedule   ST INITIAL EVALUATION:    RECENT HX OF CURRENT ILLNESS/REASON FOR REFERRAL: Patient is 84 yo female referred for skilled speech therapy evaluation due to recent CVA.    PLOF/DIET/COMMUNICATION: Soft, Chopped/Bite Size, thin liquids, no prior cognitive communication deficits    PAST MEDICAL HISTORY: per epic, \"Embolic stroke, Chronic obstructive pulmonary disease, History of aortic valve replacement, History of malignant neoplasm of breast, Hypertensive disorder, Left hemiparesis, Weakness of bilateral lower limb, Pseudobulbar affect, Mild Vascular Neurocognitive Disorder, Contracture of joint of multiple sites, Facial weakness following cerebral infarction, Impaired cognition, Pharyngeal dysphagia\"    CURRENT RESIDENCE/LIVING SITUATION: Patient currently lives in apartment with son.    EDUCATIONAL LEVEL:    ___ < OR = 8TH GRADE                   ___SOME HIGH SCHOOL  ___ HIGH SCHOOL GRAD OR GED      _X__POST SECONDARY EDUCATION    SUBJECTIVE (PT/FAMILY COMMENTS, THERAPIST OBSERVATIONS):  Patient reported changes in her memory.    CAREGIVER INVOLVEMENT: transportation, medication mangement, assists with ADLs    ASSESSMENT / BARRIERS / PLAN: Patient presents with moderate cognitive deficits evidence of SLUMS Assessment score of 14/30, which suggests dementia. Patient demonstrated increased difficulty for tasks requiring orientation awareness, thought organization, problem solving, and stm recall. Patient demonstrated great participation in evaluation, but had difficulty with maintaining attention for tasks and recall of biographical information without assistance. Educated patient on daily cognitive stimulation tasks to which patient verbalized understanding. Patient seen with regular solids and thin liquids with no overt signs or symptoms of aspiration/penetration. Educated patient on swallowing strategies (small bites/sips, sitting upright, slow pace, daily oral care) to which patient verbalized understanding.